# Patient Record
Sex: MALE | Race: WHITE | Employment: FULL TIME | URBAN - METROPOLITAN AREA
[De-identification: names, ages, dates, MRNs, and addresses within clinical notes are randomized per-mention and may not be internally consistent; named-entity substitution may affect disease eponyms.]

---

## 2017-01-23 ENCOUNTER — OFFICE VISIT (OUTPATIENT)
Dept: ORTHOPEDIC SURGERY | Age: 47
End: 2017-01-23

## 2017-01-23 VITALS
HEART RATE: 79 BPM | SYSTOLIC BLOOD PRESSURE: 130 MMHG | WEIGHT: 240 LBS | HEIGHT: 71 IN | BODY MASS INDEX: 33.6 KG/M2 | DIASTOLIC BLOOD PRESSURE: 72 MMHG

## 2017-01-23 DIAGNOSIS — M17.0 PRIMARY OSTEOARTHRITIS OF BOTH KNEES: Primary | ICD-10-CM

## 2017-01-23 PROCEDURE — 73562 X-RAY EXAM OF KNEE 3: CPT | Performed by: ORTHOPAEDIC SURGERY

## 2017-01-23 PROCEDURE — 99213 OFFICE O/P EST LOW 20 MIN: CPT | Performed by: ORTHOPAEDIC SURGERY

## 2017-01-26 ENCOUNTER — TELEPHONE (OUTPATIENT)
Dept: ORTHOPEDIC SURGERY | Age: 47
End: 2017-01-26

## 2017-02-01 ENCOUNTER — OFFICE VISIT (OUTPATIENT)
Dept: ORTHOPEDIC SURGERY | Age: 47
End: 2017-02-01

## 2017-02-01 VITALS
WEIGHT: 240 LBS | SYSTOLIC BLOOD PRESSURE: 132 MMHG | HEIGHT: 71 IN | HEART RATE: 55 BPM | BODY MASS INDEX: 33.6 KG/M2 | DIASTOLIC BLOOD PRESSURE: 80 MMHG

## 2017-02-01 DIAGNOSIS — M17.0 BILATERAL PRIMARY OSTEOARTHRITIS OF KNEE: Primary | ICD-10-CM

## 2017-02-01 PROCEDURE — 20610 DRAIN/INJ JOINT/BURSA W/O US: CPT | Performed by: ORTHOPAEDIC SURGERY

## 2018-02-21 ENCOUNTER — OFFICE VISIT (OUTPATIENT)
Dept: ORTHOPEDIC SURGERY | Age: 48
End: 2018-02-21

## 2018-02-21 VITALS
BODY MASS INDEX: 33.6 KG/M2 | HEIGHT: 71 IN | HEART RATE: 68 BPM | DIASTOLIC BLOOD PRESSURE: 81 MMHG | WEIGHT: 240 LBS | SYSTOLIC BLOOD PRESSURE: 122 MMHG

## 2018-02-21 DIAGNOSIS — M17.0 BILATERAL PRIMARY OSTEOARTHRITIS OF KNEE: Primary | ICD-10-CM

## 2018-02-21 PROCEDURE — 99213 OFFICE O/P EST LOW 20 MIN: CPT | Performed by: ORTHOPAEDIC SURGERY

## 2018-02-26 NOTE — PROGRESS NOTES
infection    X-rays:  4 views both knees show significant narrowing in the lateral compartment with subchondral sclerosis and early osteophyte formation on the right knee. Left knee shows mild joint space narrowing of the tibiofemoral joint space with no lytic or blastic lesions in either knee. No evidence for acute fracture or subluxation in either knee. Assessment:  1. Bilateral primary osteoarthritis of knee  XR Knee Bilateral Standing    XR KNEE RIGHT (3 VIEWS)    XR KNEE LEFT (3 VIEWS)       Orders  Orders Placed This Encounter   Procedures    XR Knee Bilateral Standing    XR KNEE RIGHT (3 VIEWS)    XR KNEE LEFT (3 VIEWS)       Plan:  Unfortunately, I informed him that his insurance company discontinued pain for Visco supplementation last December. Apparently the insurance company did not notify its patients of this decision. I have given him the option of trying to pursue sampling through different vendors for joint fluid therapy. He also understands the cortisone is another option. He's had very good success with joint fluid therapy for the last several years and would like to pursue that treatment. We will attempt to gain samples from one of our local vendors. If we are able to achieve this then, he would only be charged for the injection of each knee. He understands this and once we have a plan from one of the vendors regarding samples we will be in touch with him to schedule his next appointment. He understands and accepts this course of care.

## 2018-03-09 ENCOUNTER — OFFICE VISIT (OUTPATIENT)
Dept: ORTHOPEDIC SURGERY | Age: 48
End: 2018-03-09

## 2018-03-09 VITALS — WEIGHT: 245 LBS | HEIGHT: 71 IN | BODY MASS INDEX: 34.3 KG/M2

## 2018-03-09 DIAGNOSIS — M17.0 BILATERAL PRIMARY OSTEOARTHRITIS OF KNEE: Primary | ICD-10-CM

## 2018-03-09 PROCEDURE — 99999 PR OFFICE/OUTPT VISIT,PROCEDURE ONLY: CPT | Performed by: ORTHOPAEDIC SURGERY

## 2018-03-09 PROCEDURE — 20610 DRAIN/INJ JOINT/BURSA W/O US: CPT | Performed by: ORTHOPAEDIC SURGERY

## 2019-03-20 ENCOUNTER — OFFICE VISIT (OUTPATIENT)
Dept: ORTHOPEDIC SURGERY | Age: 49
End: 2019-03-20
Payer: COMMERCIAL

## 2019-03-20 VITALS
HEIGHT: 70 IN | HEART RATE: 72 BPM | BODY MASS INDEX: 30.92 KG/M2 | SYSTOLIC BLOOD PRESSURE: 100 MMHG | DIASTOLIC BLOOD PRESSURE: 69 MMHG | WEIGHT: 216 LBS

## 2019-03-20 DIAGNOSIS — M17.0 BILATERAL PRIMARY OSTEOARTHRITIS OF KNEE: Primary | ICD-10-CM

## 2019-03-20 PROCEDURE — G8427 DOCREV CUR MEDS BY ELIG CLIN: HCPCS | Performed by: ORTHOPAEDIC SURGERY

## 2019-03-20 PROCEDURE — G8484 FLU IMMUNIZE NO ADMIN: HCPCS | Performed by: ORTHOPAEDIC SURGERY

## 2019-03-20 PROCEDURE — 1036F TOBACCO NON-USER: CPT | Performed by: ORTHOPAEDIC SURGERY

## 2019-03-20 PROCEDURE — 99213 OFFICE O/P EST LOW 20 MIN: CPT | Performed by: ORTHOPAEDIC SURGERY

## 2019-03-20 PROCEDURE — G8417 CALC BMI ABV UP PARAM F/U: HCPCS | Performed by: ORTHOPAEDIC SURGERY

## 2019-03-20 RX ORDER — EPINEPHRINE 0.3 MG/.3ML
INJECTION SUBCUTANEOUS
COMMUNITY
Start: 2018-12-31

## 2019-03-20 RX ORDER — AMLODIPINE BESYLATE 5 MG/1
5 TABLET ORAL
COMMUNITY
Start: 2019-03-08

## 2019-03-20 RX ORDER — PROPRANOLOL HYDROCHLORIDE 20 MG/1
20 TABLET ORAL
COMMUNITY
Start: 2019-02-14

## 2019-03-20 RX ORDER — PHENTERMINE HYDROCHLORIDE 37.5 MG/1
37.5 TABLET ORAL
COMMUNITY
Start: 2019-02-22

## 2019-04-03 ENCOUNTER — OFFICE VISIT (OUTPATIENT)
Dept: ORTHOPEDIC SURGERY | Age: 49
End: 2019-04-03
Payer: COMMERCIAL

## 2019-04-03 VITALS — HEIGHT: 70 IN | BODY MASS INDEX: 30.92 KG/M2 | WEIGHT: 216 LBS

## 2019-04-03 DIAGNOSIS — M17.0 BILATERAL PRIMARY OSTEOARTHRITIS OF KNEE: Primary | ICD-10-CM

## 2019-04-03 PROCEDURE — 99999 PR OFFICE/OUTPT VISIT,PROCEDURE ONLY: CPT | Performed by: ORTHOPAEDIC SURGERY

## 2019-04-03 PROCEDURE — 20610 DRAIN/INJ JOINT/BURSA W/O US: CPT | Performed by: ORTHOPAEDIC SURGERY

## 2019-04-03 NOTE — PROGRESS NOTES
Timvisc:  NDC: [address]-81  Lot Number: [id number]  Expiration Date: 11/30/2021    Bilat knee. Obtained in the office today.

## 2019-04-03 NOTE — PROGRESS NOTES
Subjective:  bilateral knee pain. He is here for a Monovisc injection for the  bilateral knee(s). Objective:   Height 5' 10\" (1.778 m), weight 216 lb (98 kg). There is a milld joint effusion noted of the bilateral knee(s). There is mild pain with range of motion testing. There is no significant  instability noted. Neuro exam grossly intact both lower extremities. Intact sensation to light touch. Motor exam 4+ to 5/5 in all major motor groups. Negative Campbell's sign. Skin is warm, dry and intact with out erythema or significant increased temperature around the knee joint(s). Assessment:  Degenerative Joint Disease of the bilateral Knee(s). Plan:  Discussed Monovisc injection(s) including all  risks and benefits including increased pain, drug reaction, infection, bleeding, lack of improvement and  neurovascular injury. All the questions were answered. PROCEDURE NOTE:  PRE-PROCEDURE DIAGNOSIS: bilaterally DJD knee(s)  POST-PROCEDURE DIAGNOSIS: bilaterally DJD knee(s)    With the patient's permission, his knee(s) were both prepped in standard sterile fashion with Betadine and  Alcohol. The prefilled Monovisc injection was placed into the lateral joint space compartment(s) of each knee without difficulty using careful aspiration and injection technique. The he tolerated the procedure(s) well without difficulty. A band-aid was applied. POST-PROCEDURE INSTRUCTIONS GIVEN TO PATIENT: He was advised to ice the knee for 15-20 minutes to relieve any injection site related pain. Decrease activity for the next 24 to 48 hours. May use prescription or OTC pain relievers as needed      FOLLOW-UP:   As directed or call or return to clinic if these symptoms worsen or fail to improve as anticipated. If at any time you are concerned you may contact the office for further instructions or care.

## 2020-03-27 ENCOUNTER — OFFICE VISIT (OUTPATIENT)
Dept: ORTHOPEDIC SURGERY | Age: 50
End: 2020-03-27
Payer: COMMERCIAL

## 2020-03-27 VITALS — BODY MASS INDEX: 28.77 KG/M2 | HEIGHT: 70 IN | WEIGHT: 201 LBS | TEMPERATURE: 97.8 F

## 2020-03-27 PROCEDURE — 99213 OFFICE O/P EST LOW 20 MIN: CPT | Performed by: ORTHOPAEDIC SURGERY

## 2020-03-27 NOTE — PROGRESS NOTES
Alfred Whaley  <[id number]>  Encounter Date: 3/27/2020  YOB: 1970    Chief Complaint   Patient presents with    Knee Pain     f/u for Bilateral knee pain. History:Mr. Alfred Whaley is here in follow up regarding his bilateral knee pain. He has had previous arthroscopic/chondral repair of his lateral femoral condyle of the right knee over 6 years ago. I have been treating both of his knees for the last several years with Visco supplementation for mild to moderate osteoarthritis. The injections have provided significant pain relief and ability to maintain his function for almost a year after each series. He's had previous short-term success with cortisone but did not get the lasting benefit like he has had with joint fluid therapy. Today he reports the pain is starting to increase in both of his knees again. He still gets some mild swelling in the right knee with activities. No new injury since his last visit. No numbness or tingling radiates down the legs. No catching or locking symptoms. Pain is mostly dull achy and can occasionally interfere with his activities. Exam:  Temp 97.8 °F (36.6 °C)   Ht 5' 10\" (1.778 m)   Wt 201 lb (91.2 kg)   BMI 28.84 kg/m²   General: Alert and oriented x3, No acute distress, Cooperative and conversant. Obesity Absent   Mood and affect are appropriate. Bilateral knees: Right knee shows mild effusion with no significant varus or valgus malalignment. Normal muscle bulk and tone throughout the quadricep. He has full extension with flexion comfortable beyond 120 degrees. Mild tenderness along the joint line more laterally than medially. Trace crepitation felt with range of motion. No instability to ligamentous exam.  Left knee: Trace effusion with no erythema. No significant varus or valgus malalignment. Normal muscle bulk and tone throughout the quadricep. He has full extension with flexion comfortable beyond 120 degrees.   Trace tenderness along the joint line medially. No instability ligamentous exam.  Sensation to light touch grossly present throughout the bilateral lower extremity  Capillary refill < 2 seconds   Skin: no erythema, lesions or rashes    X-rays: 4 views of both knees were obtained in the office today. Compared to last year's views show slight interval progression of his arthritic changes with the right side showing more moderate arthritis with marginal osteophytes at the patellofemoral and lateral compartment and subchondral sclerosis in the lateral compartment. Left knee also shows mild arthritic change with joint space narrowing but no significant osteophyte formation. No lytic or blastic lesions within the metaphyseal regions of either knee. Assessment:   Diagnosis Orders   1. Bilateral primary osteoarthritis of knee  XR Knee Bilateral Standing    XR KNEE LEFT (3 VIEWS)    XR KNEE RIGHT (3 VIEWS)    MONOVISC INJECTION (For Auth/Precert)       Orders  Orders Placed This Encounter   Procedures    XR Knee Bilateral Standing    XR KNEE LEFT (3 VIEWS)    XR KNEE RIGHT (3 VIEWS)    MONOVISC INJECTION (For Auth/Precert)       Plan:  He has done quite well with Visco supplementation for the last 6 years. This has allowed him to delay further arthritic progression and possible joint replacement. I recommend that he continue with joint fluid treatments. His insurance company has changed and we will get prior authorization for Monovisc injections which are their preferred Visco supplementation. Once this is approved the office will call him to schedule his follow-up visit to receive the injections. He understands and accepts this course of care. Documentation was done using voice recognition dragon software. Every effort was made to ensure accuracy; however, inadvertent  Unintentional computerized transcription errors may be present.

## 2020-04-29 ENCOUNTER — TELEPHONE (OUTPATIENT)
Dept: ORTHOPEDIC SURGERY | Age: 50
End: 2020-04-29

## 2020-05-11 ENCOUNTER — OFFICE VISIT (OUTPATIENT)
Dept: ORTHOPEDIC SURGERY | Age: 50
End: 2020-05-11
Payer: COMMERCIAL

## 2020-05-11 VITALS — WEIGHT: 201 LBS | HEIGHT: 70 IN | TEMPERATURE: 98.4 F | BODY MASS INDEX: 28.77 KG/M2

## 2020-05-11 PROBLEM — M17.0 BILATERAL PRIMARY OSTEOARTHRITIS OF KNEE: Status: ACTIVE | Noted: 2020-05-11

## 2020-05-11 PROCEDURE — 99999 PR OFFICE/OUTPT VISIT,PROCEDURE ONLY: CPT | Performed by: ORTHOPAEDIC SURGERY

## 2020-05-11 PROCEDURE — 20610 DRAIN/INJ JOINT/BURSA W/O US: CPT | Performed by: ORTHOPAEDIC SURGERY

## 2021-02-19 ENCOUNTER — OFFICE VISIT (OUTPATIENT)
Dept: ORTHOPEDIC SURGERY | Age: 51
End: 2021-02-19
Payer: COMMERCIAL

## 2021-02-19 VITALS — WEIGHT: 205 LBS | BODY MASS INDEX: 29.35 KG/M2 | TEMPERATURE: 98.1 F | HEIGHT: 70 IN

## 2021-02-19 DIAGNOSIS — M17.0 BILATERAL PRIMARY OSTEOARTHRITIS OF KNEE: Primary | ICD-10-CM

## 2021-02-19 PROCEDURE — 99212 OFFICE O/P EST SF 10 MIN: CPT | Performed by: ORTHOPAEDIC SURGERY

## 2021-02-19 RX ORDER — IBUPROFEN 200 MG
200 TABLET ORAL EVERY 6 HOURS PRN
COMMUNITY

## 2021-02-19 NOTE — PROGRESS NOTES
Diego Bahena MD  [address]  [address]  [address] [facility]  [facility] [facility] [address]    Nadira Hernandes  <[id number]>  Encounter Date: 2/19/2021  YOB: 1970    Chief Complaint   Patient presents with    Follow-up     Bilateral knee Monovisc injection; 5/11/20. History:Mr. Nadira Hernandes is here in follow up regarding his bilateral knee pain. He last underwent bilateral knee Monovisc injections on 5/11/2020. The injections provided him long term relief but, for the past 6 weeks his pain has increased up to 3-4/10. His right knee is worse than his left, when it comes to flexion, he feels more stiffness and discomfort. The pain in his knees keeps him awake once every couple months, mainly if he has a busy day with a lot of walking. He takes OTC antiinflammatories as needed. Today, he would like to discuss repeating the injections. Exam:  Temp 98.1 °F (36.7 °C) (Infrared)   Ht 5' 10\" (1.778 m)   Wt 205 lb (93 kg)   BMI 29.41 kg/m²   General: Alert and oriented x3, No acute distress, Cooperative and conversant. Mood and affect are appropriate. Bilateral Knee : Inspection & Skin:  Knees show neutral to slight valgus alignment bilaterally. Normal muscle bulk and tone for patient's age and activity level. No significant effusion. There are no rashes, ulcerations or lesions. Palpation:     Mild tenderness on palpation along the medial joint line both knees.  Moderate tenderness on palpation along the lateral joint line R>L.  Extensor mechanism intact on palpation. Range of Motion:     Right:   o Extension: 0°  o Flexion: 120 °   Left:  o Extension: 0°  o Flexion: 125°  Strength:     Right:  o Quad 5/5. Hamstrings 5/5.   o Hip and ankle motor function are grossly intact.  Left:  o Quad 5/5. Hamstrings 5/5.   o Hip and ankle motor function are grossly intact.      Special Tests:    Does not open to my presence. I have reviewed the chart and discharge instructions (if applicable) and agree that the record reflects my personal performance and is accurate and complete. Lottie Vicente MD, 2/21/21, 6:33 PM EST. Documentation was done using voice recognition dragon software. Every effort was made to ensure accuracy; however, inadvertent  Unintentional computerized transcription errors may be present.

## 2021-03-08 ENCOUNTER — TELEPHONE (OUTPATIENT)
Dept: ORTHOPEDIC SURGERY | Age: 51
End: 2021-03-08

## 2021-03-08 NOTE — TELEPHONE ENCOUNTER
03/08/2021  MONOVISC   BILATERAL KNEES. PREVIOUS AUTHORIZATION ([id number]) or NEW # from [address] [place] [address] ([id number]) IS GOOD UNTIL 4/24/2021. PATIENT CAN HAVE ANOTHER INJECTION 3 MONTHS AFTER LAST INJECTION. PATIENT CAN HAVE INJECTION NOW AND BEFORE THE AUTHORIZATION EXPIRES ON 4/24/2021. ANA MARIA MCKENNA @ [address] [place] [address] REF # [address] [place] [address] [id number].   AP

## 2021-04-07 ENCOUNTER — OFFICE VISIT (OUTPATIENT)
Dept: ORTHOPEDIC SURGERY | Age: 51
End: 2021-04-07
Payer: COMMERCIAL

## 2021-04-07 DIAGNOSIS — M17.0 BILATERAL PRIMARY OSTEOARTHRITIS OF KNEE: Primary | ICD-10-CM

## 2021-04-07 PROCEDURE — 20610 DRAIN/INJ JOINT/BURSA W/O US: CPT | Performed by: ORTHOPAEDIC SURGERY

## 2021-04-07 NOTE — PROGRESS NOTES
Santiago Bahena MD  [facility]  [address] [facility], [address] [facility]  [address] [facility] [address] [facility], [facility] [place] [address]    Subjective:  bilateral knee pain. He is here for bilateral Monovisc injection for the  bilateral knee(s). Objective: There is a mild joint effusion noted of the bilateral knee(s). There is mild pain with range of motion testing. There is no significant  instability noted. Neuro exam grossly intact both lower extremities. Intact sensation to light touch. Motor exam 4+ to 5/5 in all major motor groups. Negative Campbell's sign. Skin is warm, dry and intact with out erythema or significant increased temperature around the knee joint(s). Assessment:  Degenerative Joint Disease of the bilateral Knee(s). Plan:  Discussed Monovisc injection including all  risks and benefits including increased pain, drug reaction, infection, bleeding, lack of improvement and  neurovascular injury. All the questions were answered. PROCEDURE NOTE:  PRE-PROCEDURE DIAGNOSIS: DJD knee  POST-PROCEDURE DIAGNOSIS: DJD knee    With the patient's permission, his bilateral knese werte prepped in standard sterile fashion with betadine and alcohol. The prefilled 4mL injection of the preferred Monosvisc was injected into the bilateral lateral joint space compartments without difficulty. The patient tolerated the procedure(s) well without difficulty. A band-aid was applied. POST-PROCEDURE INSTRUCTIONS GIVEN TO PATIENT: The patient was advised to ice the knee for 15-20 minutes to relieve any injection site related pain. Decrease activity for the next 24 to 48 hours. May use prescription or OTC pain relievers as needed      FOLLOW-UP:   As directed or call or return to clinic if these symptoms worsen or fail to improve as anticipated. If at any time you are concerned you may contact the office for further instructions or care.      By signing my name below, I,

## 2022-02-09 ENCOUNTER — HOSPITAL ENCOUNTER (OUTPATIENT)
Dept: GENERAL RADIOLOGY | Age: 52
Discharge: HOME | End: 2022-02-09
Payer: COMMERCIAL

## 2022-02-09 DIAGNOSIS — M17.0 ARTHRITIS OF BOTH KNEES: ICD-10-CM

## 2022-02-09 PROCEDURE — 73564 X-RAY EXAM KNEE 4 OR MORE: CPT

## 2023-11-09 ENCOUNTER — OFFICE (OUTPATIENT)
Dept: URBAN - METROPOLITAN AREA CLINIC 17 | Facility: CLINIC | Age: 53
End: 2023-11-09

## 2023-11-09 VITALS
OXYGEN SATURATION: 97 % | SYSTOLIC BLOOD PRESSURE: 124 MMHG | HEIGHT: 71 IN | WEIGHT: 183 LBS | DIASTOLIC BLOOD PRESSURE: 82 MMHG | HEART RATE: 83 BPM

## 2023-11-09 DIAGNOSIS — Z80.0 FAMILY HISTORY OF MALIGNANT NEOPLASM OF DIGESTIVE ORGANS: ICD-10-CM

## 2023-11-09 DIAGNOSIS — Z86.010 PERSONAL HISTORY OF COLONIC POLYPS: ICD-10-CM

## 2023-11-09 PROCEDURE — 99202 OFFICE O/P NEW SF 15 MIN: CPT | Performed by: INTERNAL MEDICINE

## 2024-01-30 DIAGNOSIS — M17.0 BILATERAL PRIMARY OSTEOARTHRITIS OF KNEE: Primary | ICD-10-CM

## 2024-01-30 NOTE — PROGRESS NOTES
Date of Encounter: 2/1/2024  Patient Name:Kali Weiss  Medical Record Number: [id number]    Chief Complaint   Patient presents with    Knee Pain     Bilateral Knees       History of Present Illness:  Kali Weiss is a 53 y.o. male here for evaluation of his bilateral knees.  His current symptoms are described below and I reviewed them with him today.  He has been undergoing intermittent visco supplementation injections since 2010 on the right and 2013 on the left.  His last injections were Monovisc in both knees 3/24/2022 from me (he has had this brand since 2019 with excellent results).  Lat year due to insurance issues he had Euflexxa injections in May of 2023 by a different physician and he did not feel like they helped as much as the Monvisc has in the past.  No recent injuries.  He still is active and plays basketball at least once week.    Pain Assessment  Location of Pain: Knee  Location Modifiers: Right, Left  Severity of Pain: 8  Quality of Pain: Aching, Dull  Duration of Pain: Persistent  Frequency of Pain: Intermittent  Date Pain First Started:  (15 years ago)  Aggravating Factors: Bending, Stretching, Straightening, Exercise, Kneeling, Squatting, Standing, Walking, Stairs  Limiting Behavior: Yes  Result of Injury: No  Work-Related Injury: No  Are there other pain locations you wish to document?: No    Past Medical History:   Diagnosis Date    Low back pain 9/14/2015       Past Surgical History:   Procedure Laterality Date    ACHILLES TENDON SURGERY Left     KNEE CARTILAGE SURGERY Bilateral     SHOULDER DEBRIDEMENT Left        Current Outpatient Medications   Medication Sig Dispense Refill    Flaxseed, Linseed, (FLAX SEED OIL) 1000 MG CAPS Take 1,000 mg by mouth daily      Cyanocobalamin ER 1000 MCG TBCR Take 1 tablet by mouth daily      Ascorbic Acid (VITAMIN C) 1000 MG tablet Take 1 tablet by mouth daily       No current facility-administered medications for this visit.      allergies, social and

## 2024-02-01 ENCOUNTER — OFFICE VISIT (OUTPATIENT)
Age: 54
End: 2024-02-01
Payer: COMMERCIAL

## 2024-02-01 ENCOUNTER — HOSPITAL ENCOUNTER (OUTPATIENT)
Age: 54
Discharge: HOME | End: 2024-02-01

## 2024-02-01 VITALS — HEIGHT: 70 IN | WEIGHT: 205 LBS | BODY MASS INDEX: 29.35 KG/M2

## 2024-02-01 DIAGNOSIS — M17.0 BILATERAL PRIMARY OSTEOARTHRITIS OF KNEE: Primary | ICD-10-CM

## 2024-02-01 DIAGNOSIS — M17.0 BILATERAL PRIMARY OSTEOARTHRITIS OF KNEE: ICD-10-CM

## 2024-02-01 PROBLEM — F41.8 SITUATIONAL ANXIETY: Status: ACTIVE | Noted: 2023-05-19

## 2024-02-01 PROBLEM — Z80.0 FAMILY HISTORY OF COLON CANCER: Status: ACTIVE | Noted: 2024-02-01

## 2024-02-01 PROCEDURE — 99213 OFFICE O/P EST LOW 20 MIN: CPT | Performed by: ORTHOPAEDIC SURGERY

## 2024-02-01 RX ORDER — MULTIVIT WITH MINERALS/LUTEIN
1000 TABLET ORAL DAILY
COMMUNITY

## 2024-02-01 RX ORDER — DIMENHYDRINATE 50 MG
1000 TABLET ORAL DAILY
COMMUNITY

## 2024-02-08 ENCOUNTER — OFFICE VISIT (OUTPATIENT)
Age: 54
End: 2024-02-08

## 2024-02-08 VITALS — WEIGHT: 184.3 LBS | BODY MASS INDEX: 26.39 KG/M2 | HEIGHT: 70 IN

## 2024-02-08 DIAGNOSIS — M17.0 BILATERAL PRIMARY OSTEOARTHRITIS OF KNEE: Primary | ICD-10-CM

## 2024-02-08 NOTE — PROGRESS NOTES
Subjective:  bilateral knee pain.   He is here for a Monovisc injection for the  bilateral knees.     Objective:   Height 1.778 m (5' 10\"), weight 83.6 kg (184 lb 4.9 oz).    There is minimal joint effusion noted of the bilateral knees. There is mild pain with non weight bearing range of motion testing. There is no significant  instability noted.    Neuro exam grossly intact both lower extremities. Intact sensation to light touch. Motor exam 5/5 in all major motor groups.      Skin is warm, dry and intact with out erythema or significant increased temperature around the knee joints.     Assessment:   Diagnosis Orders   1. Bilateral primary osteoarthritis of knee  MONOVISC INJECTION            Plan:  Discussed Monovisc injections including all  risks and benefits including increased pain, drug reaction, infection, bleeding, lack of improvement and  neurovascular injury.   All the questions were answered.    PROCEDURE NOTE:  PRE-PROCEDURE DIAGNOSIS: bilateral DJD knees  POST-PROCEDURE DIAGNOSIS: bilateral DJD knees    With the patient's permission, his bilateral  knees were prepped in standard sterile fashion with Betadine. The prefilled Monovisc (88 mg) injection was placed into the joint space compartment of each knee via and inferolateral approach without difficulty using careful aspiration and injection technique.  The he tolerated the procedures well.  A band-aid was applied to each site.     POST-PROCEDURE INSTRUCTIONS GIVEN TO PATIENT: He was advised to ice the knee for 15-20 minutes to relieve any injection site related pain. Decrease activity for the next 24 to 48 hours. May use prescription or OTC pain relievers as needed    FOLLOW-UP:   As directed or call or return to clinic if these symptoms worsen or fail to improve as anticipated. If at any time you are concerned you may contact the office for further instructions or care.

## 2024-08-26 ENCOUNTER — OFFICE VISIT (OUTPATIENT)
Age: 54
End: 2024-08-26
Payer: COMMERCIAL

## 2024-08-26 VITALS — WEIGHT: 184 LBS | BODY MASS INDEX: 26.34 KG/M2 | HEIGHT: 70 IN

## 2024-08-26 DIAGNOSIS — M17.11 PRIMARY OSTEOARTHRITIS OF RIGHT KNEE: Primary | ICD-10-CM

## 2024-08-26 DIAGNOSIS — M17.12 PRIMARY OSTEOARTHRITIS OF LEFT KNEE: ICD-10-CM

## 2024-08-26 PROCEDURE — 99214 OFFICE O/P EST MOD 30 MIN: CPT | Performed by: ORTHOPAEDIC SURGERY

## 2024-08-27 ENCOUNTER — PREP FOR PROCEDURE (OUTPATIENT)
Age: 54
End: 2024-08-27

## 2024-08-27 DIAGNOSIS — M17.11 PRIMARY OSTEOARTHRITIS OF RIGHT KNEE: ICD-10-CM

## 2024-08-27 DIAGNOSIS — Z01.818 PRE-OP TESTING: Primary | ICD-10-CM

## 2024-08-27 RX ORDER — SODIUM CHLORIDE 0.9 % (FLUSH) 0.9 %
5-40 SYRINGE (ML) INJECTION PRN
Status: CANCELLED | OUTPATIENT
Start: 2024-08-27

## 2024-08-27 RX ORDER — SODIUM CHLORIDE 9 MG/ML
INJECTION, SOLUTION INTRAVENOUS PRN
Status: CANCELLED | OUTPATIENT
Start: 2024-08-27

## 2024-08-27 RX ORDER — TRANEXAMIC ACID 650 MG/1
1950 TABLET ORAL
Status: CANCELLED | OUTPATIENT
Start: 2024-08-27

## 2024-08-27 RX ORDER — ACETAMINOPHEN 325 MG/1
1000 TABLET ORAL ONCE
Status: CANCELLED | OUTPATIENT
Start: 2024-08-27 | End: 2024-08-27

## 2024-08-27 RX ORDER — SODIUM CHLORIDE 9 MG/ML
INJECTION, SOLUTION INTRAVENOUS CONTINUOUS
Status: CANCELLED | OUTPATIENT
Start: 2024-08-27

## 2024-08-27 RX ORDER — SODIUM CHLORIDE 0.9 % (FLUSH) 0.9 %
5-40 SYRINGE (ML) INJECTION EVERY 12 HOURS SCHEDULED
Status: CANCELLED | OUTPATIENT
Start: 2024-08-27

## 2024-08-27 RX ORDER — MELOXICAM 7.5 MG/1
7.5 TABLET ORAL ONCE
Status: CANCELLED | OUTPATIENT
Start: 2024-08-27 | End: 2024-08-27

## 2024-09-12 ENCOUNTER — TELEPHONE (OUTPATIENT)
Dept: ORTHOPEDIC SURGERY | Age: 54
End: 2024-09-12

## 2024-09-12 DIAGNOSIS — Z96.651 TOTAL KNEE REPLACEMENT STATUS, RIGHT: Primary | ICD-10-CM

## 2024-09-12 PROCEDURE — MISCCOLD COLD THERAPY UNIT AND PAD: Performed by: ORTHOPAEDIC SURGERY

## 2024-09-13 DIAGNOSIS — Z01.818 PRE-OP TESTING: ICD-10-CM

## 2024-09-13 LAB
25(OH)D3 SERPL-MCNC: 17.7 NG/ML
ABO + RH BLD: NORMAL
ALBUMIN SERPL-MCNC: 3.7 G/DL (ref 3.4–5)
ALBUMIN/GLOB SERPL: 2.2 {RATIO} (ref 1.1–2.2)
ALP SERPL-CCNC: 37 U/L (ref 40–129)
ALT SERPL-CCNC: 21 U/L (ref 10–40)
ANION GAP SERPL CALCULATED.3IONS-SCNC: 9 MMOL/L (ref 3–16)
APTT BLD: 27.8 SEC (ref 22.1–36.4)
AST SERPL-CCNC: 19 U/L (ref 15–37)
BASOPHILS # BLD: 0.1 K/UL (ref 0–0.2)
BASOPHILS NFR BLD: 0.7 %
BILIRUB SERPL-MCNC: <0.2 MG/DL (ref 0–1)
BLD GP AB SCN SERPL QL: NORMAL
BUN SERPL-MCNC: 17 MG/DL (ref 7–20)
CALCIUM SERPL-MCNC: 9 MG/DL (ref 8.3–10.6)
CHLORIDE SERPL-SCNC: 104 MMOL/L (ref 99–110)
CO2 SERPL-SCNC: 26 MMOL/L (ref 21–32)
CREAT SERPL-MCNC: 0.9 MG/DL (ref 0.9–1.3)
DEPRECATED RDW RBC AUTO: 13.2 % (ref 12.4–15.4)
EOSINOPHIL # BLD: 0.1 K/UL (ref 0–0.6)
EOSINOPHIL NFR BLD: 1.8 %
GFR SERPLBLD CREATININE-BSD FMLA CKD-EPI: >90 ML/MIN/{1.73_M2}
GLUCOSE SERPL-MCNC: 95 MG/DL (ref 70–99)
HCT VFR BLD AUTO: 45.5 % (ref 40.5–52.5)
HGB BLD-MCNC: 15.6 G/DL (ref 13.5–17.5)
INR PPP: 0.99 (ref 0.85–1.15)
LYMPHOCYTES # BLD: 1.6 K/UL (ref 1–5.1)
LYMPHOCYTES NFR BLD: 20.1 %
MCH RBC QN AUTO: 30.6 PG (ref 26–34)
MCHC RBC AUTO-ENTMCNC: 34.3 G/DL (ref 31–36)
MCV RBC AUTO: 89.3 FL (ref 80–100)
MONOCYTES # BLD: 0.5 K/UL (ref 0–1.3)
MONOCYTES NFR BLD: 6 %
NEUTROPHILS # BLD: 5.6 K/UL (ref 1.7–7.7)
NEUTROPHILS NFR BLD: 71.4 %
PLATELET # BLD AUTO: 294 K/UL (ref 135–450)
PMV BLD AUTO: 8 FL (ref 5–10.5)
POTASSIUM SERPL-SCNC: 4.4 MMOL/L (ref 3.5–5.1)
PROT SERPL-MCNC: 5.4 G/DL (ref 6.4–8.2)
PROTHROMBIN TIME: 13.3 SEC (ref 11.9–14.9)
RBC # BLD AUTO: 5.09 M/UL (ref 4.2–5.9)
SODIUM SERPL-SCNC: 139 MMOL/L (ref 136–145)
WBC # BLD AUTO: 7.8 K/UL (ref 4–11)

## 2024-09-14 LAB
EST. AVERAGE GLUCOSE BLD GHB EST-MCNC: 91.1 MG/DL
HBA1C MFR BLD: 4.8 %

## 2024-09-15 LAB
BACTERIA UR CULT: NORMAL
MRSA SPEC QL CULT: NORMAL

## 2024-09-16 ENCOUNTER — TELEPHONE (OUTPATIENT)
Age: 54
End: 2024-09-16

## 2024-09-16 DIAGNOSIS — E55.9 VITAMIN D DEFICIENCY: ICD-10-CM

## 2024-09-16 DIAGNOSIS — Z22.321 MSSA (METHICILLIN-SUSCEPTIBLE STAPHYLOCOCCUS AUREUS) COLONIZATION: Primary | ICD-10-CM

## 2024-09-16 PROBLEM — M17.12 PRIMARY OSTEOARTHRITIS OF LEFT KNEE: Status: ACTIVE | Noted: 2024-09-16

## 2024-09-16 RX ORDER — MUPIROCIN 20 MG/G
OINTMENT TOPICAL
Qty: 22 G | Refills: 0 | Status: SHIPPED | OUTPATIENT
Start: 2024-09-16

## 2024-09-16 RX ORDER — MULTIVIT-MIN/IRON/FOLIC ACID/K 18-600-40
2000 CAPSULE ORAL DAILY
Qty: 30 CAPSULE | Refills: 3 | Status: SHIPPED | OUTPATIENT
Start: 2024-09-16 | End: 2024-10-16

## 2024-09-17 ENCOUNTER — TELEPHONE (OUTPATIENT)
Dept: ORTHOPEDIC SURGERY | Age: 54
End: 2024-09-17

## 2024-09-17 RX ORDER — MULTIVITAMIN WITH IRON
1 TABLET ORAL DAILY
COMMUNITY

## 2024-09-17 NOTE — H&P (VIEW-ONLY)
SUBJECTIVE:   CHIEF COMPLAINT:  Chief Complaint   Patient presents with   • Pre-Op Exam     NURSING NOTES:  Nursing Notes:   Vanita Caputo  09/17/24 [id number]  Signed  Pre-op exam      HISTORY OF PRESENT ILLNESS:  (Location/Symptom, Timing/Onset, Context/Setting, Quality, Duration, Alleviating/Aggrevating Factors, Severity  1-3 for level 3, 4+ for acute or 3+ for chronic for level 4/5)  Kali Weiss is a 54 year old male who presents today with the following concerns:  Here for preop physical.  Scheduled for right total knee replacement.  No ear  PAST MEDICAL HISTORY:  Past Medical History:   Diagnosis Date   • Adverse effect of anesthesia     Nausea and vomting   • Arthritis     knees and shoulder   • Hypercholesteremia     diet controlled   • Hyperlipidemia    • Shingles     8-9 YEARS AGO        CURRENT MEDICATIONS:    Current Outpatient Medications:   •  cyanocobalamin (VITAMIN B12) 1,000 mcg tablet, Take 1 Tab by mouth daily, Disp: , Rfl:   •  multivitamin (MULTIPLE VITAMINS) tablet, Take 1 Tab by mouth daily, Disp: , Rfl:   •  terbinafine HCL (LAMISIL) 250 mg tablet, Take 250 mg by mouth daily, Disp: , Rfl:   •  EPINEPHrine (EPIPEN) 0.3 mg/0.3 mL syringe, Inject 0.3 mL intramuscularly once as needed for up to 1 dose, Disp: 2 Each, Rfl: 1  •  omega 3-dha-epa-fish oil (FISH OIL) 100-160-1,000 mg Capsule, Take by mouth daily at 6pm, Disp: , Rfl:   •  FLAXSEED OIL ORAL, Take by mouth daily at 6pm, Disp: , Rfl:   •  ascorbic acid (VITAMIN C ORAL), Take by mouth daily at 6pm, Disp: , Rfl:   •  mecobalamin (B12 ACTIVE ORAL), Take by mouth daily at 6pm, Disp: , Rfl:   •  allergen injection syringe, Inject 1 Each subcutaneously every 2 weeks Environmental Immunotherapy Injection., Disp: , Rfl:   •  ibuprofen (ADVIL) 200 mg tablet, Take 200 mg by mouth every 8 hours as needed, Disp: , Rfl:   •  mupirocin (BACTROBAN) 2 % topical ointment, Apply Start on 9/26/24, Disp: , Rfl:   •  propranoloL (INDERAL) 20 mg tablet,

## 2024-09-26 ENCOUNTER — PATIENT MESSAGE (OUTPATIENT)
Age: 54
End: 2024-09-26

## 2024-09-27 ENCOUNTER — TELEPHONE (OUTPATIENT)
Age: 54
End: 2024-09-27

## 2024-09-30 ENCOUNTER — ANESTHESIA EVENT (OUTPATIENT)
Age: 54
End: 2024-09-30
Payer: COMMERCIAL

## 2024-09-30 PROCEDURE — 2500000003 HC RX 250 WO HCPCS: Performed by: ORTHOPAEDIC SURGERY

## 2024-09-30 PROCEDURE — 6360000002 HC RX W HCPCS: Performed by: ORTHOPAEDIC SURGERY

## 2024-09-30 PROCEDURE — 2580000003 HC RX 258: Performed by: ORTHOPAEDIC SURGERY

## 2024-10-01 ENCOUNTER — APPOINTMENT (OUTPATIENT)
Age: 54
End: 2024-10-01
Attending: ORTHOPAEDIC SURGERY
Payer: COMMERCIAL

## 2024-10-01 ENCOUNTER — HOSPITAL ENCOUNTER (OUTPATIENT)
Age: 54
Setting detail: OUTPATIENT SURGERY
Discharge: HOME | End: 2024-10-01
Attending: ORTHOPAEDIC SURGERY | Admitting: ORTHOPAEDIC SURGERY
Payer: COMMERCIAL

## 2024-10-01 ENCOUNTER — ANESTHESIA (OUTPATIENT)
Age: 54
End: 2024-10-01
Payer: COMMERCIAL

## 2024-10-01 VITALS
TEMPERATURE: 97.9 F | HEIGHT: 71 IN | DIASTOLIC BLOOD PRESSURE: 67 MMHG | HEART RATE: 62 BPM | RESPIRATION RATE: 13 BRPM | SYSTOLIC BLOOD PRESSURE: 105 MMHG | WEIGHT: 185 LBS | BODY MASS INDEX: 25.9 KG/M2 | OXYGEN SATURATION: 99 %

## 2024-10-01 DIAGNOSIS — Z22.321 MSSA (METHICILLIN-SUSCEPTIBLE STAPHYLOCOCCUS AUREUS) COLONIZATION: ICD-10-CM

## 2024-10-01 DIAGNOSIS — M17.11 PRIMARY OSTEOARTHRITIS OF RIGHT KNEE: Primary | ICD-10-CM

## 2024-10-01 LAB — GLUCOSE BLD-MCNC: 98 MG/DL (ref 70–99)

## 2024-10-01 PROCEDURE — 6360000002 HC RX W HCPCS: Performed by: NURSE ANESTHETIST, CERTIFIED REGISTERED

## 2024-10-01 PROCEDURE — 94761 N-INVAS EAR/PLS OXIMETRY MLT: CPT

## 2024-10-01 PROCEDURE — 6370000000 HC RX 637 (ALT 250 FOR IP): Performed by: ORTHOPAEDIC SURGERY

## 2024-10-01 PROCEDURE — 2580000003 HC RX 258: Performed by: ORTHOPAEDIC SURGERY

## 2024-10-01 PROCEDURE — C1713 ANCHOR/SCREW BN/BN,TIS/BN: HCPCS | Performed by: ORTHOPAEDIC SURGERY

## 2024-10-01 PROCEDURE — 3700000000 HC ANESTHESIA ATTENDED CARE: Performed by: ORTHOPAEDIC SURGERY

## 2024-10-01 PROCEDURE — 27447 TOTAL KNEE ARTHROPLASTY: CPT | Performed by: ORTHOPAEDIC SURGERY

## 2024-10-01 PROCEDURE — 7100000000 HC PACU RECOVERY - FIRST 15 MIN: Performed by: ORTHOPAEDIC SURGERY

## 2024-10-01 PROCEDURE — 3600000019 HC SURGERY ROBOT ADDTL 15MIN: Performed by: ORTHOPAEDIC SURGERY

## 2024-10-01 PROCEDURE — 2720000010 HC SURG SUPPLY STERILE: Performed by: ORTHOPAEDIC SURGERY

## 2024-10-01 PROCEDURE — 97110 THERAPEUTIC EXERCISES: CPT

## 2024-10-01 PROCEDURE — 6360000002 HC RX W HCPCS

## 2024-10-01 PROCEDURE — 2709999900 HC NON-CHARGEABLE SUPPLY: Performed by: ORTHOPAEDIC SURGERY

## 2024-10-01 PROCEDURE — 3600000009 HC SURGERY ROBOT BASE: Performed by: ORTHOPAEDIC SURGERY

## 2024-10-01 PROCEDURE — C1776 JOINT DEVICE (IMPLANTABLE): HCPCS | Performed by: ORTHOPAEDIC SURGERY

## 2024-10-01 PROCEDURE — 82962 GLUCOSE BLOOD TEST: CPT

## 2024-10-01 PROCEDURE — 6360000002 HC RX W HCPCS: Performed by: ANESTHESIOLOGY

## 2024-10-01 PROCEDURE — 2500000003 HC RX 250 WO HCPCS: Performed by: ORTHOPAEDIC SURGERY

## 2024-10-01 PROCEDURE — 2500000003 HC RX 250 WO HCPCS: Performed by: NURSE ANESTHETIST, CERTIFIED REGISTERED

## 2024-10-01 PROCEDURE — 6360000002 HC RX W HCPCS: Performed by: ORTHOPAEDIC SURGERY

## 2024-10-01 PROCEDURE — 7100000001 HC PACU RECOVERY - ADDTL 15 MIN: Performed by: ORTHOPAEDIC SURGERY

## 2024-10-01 PROCEDURE — 2580000003 HC RX 258: Performed by: NURSE ANESTHETIST, CERTIFIED REGISTERED

## 2024-10-01 PROCEDURE — S2900 ROBOTIC SURGICAL SYSTEM: HCPCS | Performed by: ORTHOPAEDIC SURGERY

## 2024-10-01 PROCEDURE — 7100000011 HC PHASE II RECOVERY - ADDTL 15 MIN: Performed by: ORTHOPAEDIC SURGERY

## 2024-10-01 PROCEDURE — A4217 STERILE WATER/SALINE, 500 ML: HCPCS | Performed by: ORTHOPAEDIC SURGERY

## 2024-10-01 PROCEDURE — 64447 NJX AA&/STRD FEMORAL NRV IMG: CPT | Performed by: ANESTHESIOLOGY

## 2024-10-01 PROCEDURE — 97116 GAIT TRAINING THERAPY: CPT

## 2024-10-01 PROCEDURE — 2700000000 HC OXYGEN THERAPY PER DAY

## 2024-10-01 PROCEDURE — 3700000001 HC ADD 15 MINUTES (ANESTHESIA): Performed by: ORTHOPAEDIC SURGERY

## 2024-10-01 PROCEDURE — 20985 CPTR-ASST DIR MS PX: CPT | Performed by: ORTHOPAEDIC SURGERY

## 2024-10-01 PROCEDURE — 7100000010 HC PHASE II RECOVERY - FIRST 15 MIN: Performed by: ORTHOPAEDIC SURGERY

## 2024-10-01 PROCEDURE — 73560 X-RAY EXAM OF KNEE 1 OR 2: CPT

## 2024-10-01 PROCEDURE — 97161 PT EVAL LOW COMPLEX 20 MIN: CPT

## 2024-10-01 DEVICE — KIT KNEE IMPL CAPPD K2 HEMI ADV CMTLS VE BEAR K2VEBZIMMERBIOMET: Type: IMPLANTABLE DEVICE | Site: KNEE | Status: FUNCTIONAL

## 2024-10-01 DEVICE — HEADLESS TROCHAR PIN 75MM
Type: IMPLANTABLE DEVICE | Site: KNEE | Status: FUNCTIONAL
Brand: ZUK

## 2024-10-01 RX ORDER — ASPIRIN 325 MG
325 TABLET ORAL DAILY
Qty: 30 TABLET | Refills: 0 | Status: SHIPPED | OUTPATIENT
Start: 2024-10-01

## 2024-10-01 RX ORDER — FENTANYL CITRATE 50 UG/ML
50 INJECTION, SOLUTION INTRAMUSCULAR; INTRAVENOUS EVERY 5 MIN PRN
Status: DISCONTINUED | OUTPATIENT
Start: 2024-10-01 | End: 2024-10-01 | Stop reason: HOSPADM

## 2024-10-01 RX ORDER — MIDAZOLAM HYDROCHLORIDE 1 MG/ML
INJECTION INTRAMUSCULAR; INTRAVENOUS
Status: COMPLETED
Start: 2024-10-01 | End: 2024-10-01

## 2024-10-01 RX ORDER — LIDOCAINE HYDROCHLORIDE 20 MG/ML
INJECTION, SOLUTION INTRAVENOUS
Status: DISCONTINUED | OUTPATIENT
Start: 2024-10-01 | End: 2024-10-01 | Stop reason: SDUPTHER

## 2024-10-01 RX ORDER — SODIUM CHLORIDE 0.9 % (FLUSH) 0.9 %
5-40 SYRINGE (ML) INJECTION EVERY 12 HOURS SCHEDULED
Status: DISCONTINUED | OUTPATIENT
Start: 2024-10-01 | End: 2024-10-01 | Stop reason: SDUPTHER

## 2024-10-01 RX ORDER — NALOXONE HYDROCHLORIDE 0.4 MG/ML
INJECTION, SOLUTION INTRAMUSCULAR; INTRAVENOUS; SUBCUTANEOUS PRN
Status: DISCONTINUED | OUTPATIENT
Start: 2024-10-01 | End: 2024-10-01 | Stop reason: HOSPADM

## 2024-10-01 RX ORDER — MEPERIDINE HYDROCHLORIDE 25 MG/ML
12.5 INJECTION INTRAMUSCULAR; INTRAVENOUS; SUBCUTANEOUS
Status: DISCONTINUED | OUTPATIENT
Start: 2024-10-01 | End: 2024-10-01 | Stop reason: HOSPADM

## 2024-10-01 RX ORDER — ROPIVACAINE HYDROCHLORIDE 5 MG/ML
INJECTION, SOLUTION EPIDURAL; INFILTRATION; PERINEURAL
Status: COMPLETED | OUTPATIENT
Start: 2024-10-01 | End: 2024-10-01

## 2024-10-01 RX ORDER — OXYCODONE AND ACETAMINOPHEN 5; 325 MG/1; MG/1
1-2 TABLET ORAL EVERY 6 HOURS PRN
Qty: 56 TABLET | Refills: 0 | Status: SHIPPED | OUTPATIENT
Start: 2024-10-01 | End: 2024-10-08

## 2024-10-01 RX ORDER — TRANEXAMIC ACID 650 MG/1
1950 TABLET ORAL
Status: DISCONTINUED | OUTPATIENT
Start: 2024-10-01 | End: 2024-10-01 | Stop reason: HOSPADM

## 2024-10-01 RX ORDER — SODIUM CHLORIDE 9 MG/ML
INJECTION, SOLUTION INTRAVENOUS PRN
Status: DISCONTINUED | OUTPATIENT
Start: 2024-10-01 | End: 2024-10-01 | Stop reason: HOSPADM

## 2024-10-01 RX ORDER — MAGNESIUM HYDROXIDE 1200 MG/15ML
LIQUID ORAL CONTINUOUS PRN
Status: COMPLETED | OUTPATIENT
Start: 2024-10-01 | End: 2024-10-01

## 2024-10-01 RX ORDER — ROCURONIUM BROMIDE 10 MG/ML
INJECTION, SOLUTION INTRAVENOUS
Status: DISCONTINUED | OUTPATIENT
Start: 2024-10-01 | End: 2024-10-01 | Stop reason: SDUPTHER

## 2024-10-01 RX ORDER — ONDANSETRON 2 MG/ML
INJECTION INTRAMUSCULAR; INTRAVENOUS
Status: DISCONTINUED | OUTPATIENT
Start: 2024-10-01 | End: 2024-10-01 | Stop reason: SDUPTHER

## 2024-10-01 RX ORDER — SODIUM CHLORIDE 0.9 % (FLUSH) 0.9 %
5-40 SYRINGE (ML) INJECTION PRN
Status: DISCONTINUED | OUTPATIENT
Start: 2024-10-01 | End: 2024-10-01 | Stop reason: SDUPTHER

## 2024-10-01 RX ORDER — ACETAMINOPHEN 500 MG
1000 TABLET ORAL ONCE
Status: COMPLETED | OUTPATIENT
Start: 2024-10-01 | End: 2024-10-01

## 2024-10-01 RX ORDER — SODIUM CHLORIDE 0.9 % (FLUSH) 0.9 %
5-40 SYRINGE (ML) INJECTION EVERY 12 HOURS SCHEDULED
Status: DISCONTINUED | OUTPATIENT
Start: 2024-10-01 | End: 2024-10-01 | Stop reason: HOSPADM

## 2024-10-01 RX ORDER — SODIUM CHLORIDE 9 MG/ML
INJECTION, SOLUTION INTRAVENOUS PRN
Status: DISCONTINUED | OUTPATIENT
Start: 2024-10-01 | End: 2024-10-01 | Stop reason: SDUPTHER

## 2024-10-01 RX ORDER — PROMETHAZINE HYDROCHLORIDE 25 MG/1
25 TABLET ORAL EVERY 6 HOURS PRN
Qty: 15 TABLET | Refills: 0 | Status: SHIPPED | OUTPATIENT
Start: 2024-10-01 | End: 2024-10-08

## 2024-10-01 RX ORDER — SODIUM CHLORIDE 9 MG/ML
INJECTION, SOLUTION INTRAVENOUS
Status: DISCONTINUED | OUTPATIENT
Start: 2024-10-01 | End: 2024-10-01 | Stop reason: SDUPTHER

## 2024-10-01 RX ORDER — BUPIVACAINE HYDROCHLORIDE 5 MG/ML
INJECTION, SOLUTION EPIDURAL; INTRACAUDAL PRN
Status: DISCONTINUED | OUTPATIENT
Start: 2024-10-01 | End: 2024-10-01 | Stop reason: ALTCHOICE

## 2024-10-01 RX ORDER — SODIUM CHLORIDE 0.9 % (FLUSH) 0.9 %
5-40 SYRINGE (ML) INJECTION PRN
Status: DISCONTINUED | OUTPATIENT
Start: 2024-10-01 | End: 2024-10-01 | Stop reason: HOSPADM

## 2024-10-01 RX ORDER — MIDAZOLAM HYDROCHLORIDE 1 MG/ML
2 INJECTION INTRAMUSCULAR; INTRAVENOUS ONCE
Status: DISCONTINUED | OUTPATIENT
Start: 2024-10-01 | End: 2024-10-01 | Stop reason: HOSPADM

## 2024-10-01 RX ORDER — MELOXICAM 7.5 MG/1
7.5 TABLET ORAL ONCE
Status: COMPLETED | OUTPATIENT
Start: 2024-10-01 | End: 2024-10-01

## 2024-10-01 RX ORDER — FENTANYL CITRATE 50 UG/ML
25 INJECTION, SOLUTION INTRAMUSCULAR; INTRAVENOUS EVERY 5 MIN PRN
Status: DISCONTINUED | OUTPATIENT
Start: 2024-10-01 | End: 2024-10-01 | Stop reason: HOSPADM

## 2024-10-01 RX ORDER — CEFADROXIL 500 MG/1
500 CAPSULE ORAL 2 TIMES DAILY
Qty: 20 CAPSULE | Refills: 0 | Status: SHIPPED | OUTPATIENT
Start: 2024-10-01 | End: 2024-10-01

## 2024-10-01 RX ORDER — FENTANYL CITRATE 50 UG/ML
INJECTION, SOLUTION INTRAMUSCULAR; INTRAVENOUS
Status: DISCONTINUED | OUTPATIENT
Start: 2024-10-01 | End: 2024-10-01 | Stop reason: SDUPTHER

## 2024-10-01 RX ORDER — SUCCINYLCHOLINE/SOD CL,ISO/PF 200MG/10ML
SYRINGE (ML) INTRAVENOUS
Status: DISCONTINUED | OUTPATIENT
Start: 2024-10-01 | End: 2024-10-01 | Stop reason: SDUPTHER

## 2024-10-01 RX ORDER — PROPOFOL 10 MG/ML
INJECTION, EMULSION INTRAVENOUS
Status: DISCONTINUED | OUTPATIENT
Start: 2024-10-01 | End: 2024-10-01 | Stop reason: SDUPTHER

## 2024-10-01 RX ORDER — ONDANSETRON 2 MG/ML
4 INJECTION INTRAMUSCULAR; INTRAVENOUS
Status: COMPLETED | OUTPATIENT
Start: 2024-10-01 | End: 2024-10-01

## 2024-10-01 RX ORDER — VANCOMYCIN HYDROCHLORIDE 1 G/20ML
INJECTION, POWDER, LYOPHILIZED, FOR SOLUTION INTRAVENOUS PRN
Status: DISCONTINUED | OUTPATIENT
Start: 2024-10-01 | End: 2024-10-01 | Stop reason: ALTCHOICE

## 2024-10-01 RX ORDER — CEFADROXIL 500 MG/1
500 CAPSULE ORAL 2 TIMES DAILY
Qty: 20 CAPSULE | Refills: 0 | Status: SHIPPED | OUTPATIENT
Start: 2024-10-01 | End: 2024-10-11

## 2024-10-01 RX ORDER — DEXAMETHASONE SODIUM PHOSPHATE 10 MG/ML
8 INJECTION, SOLUTION INTRAMUSCULAR; INTRAVENOUS ONCE
Status: COMPLETED | OUTPATIENT
Start: 2024-10-01 | End: 2024-10-01

## 2024-10-01 RX ORDER — SODIUM CHLORIDE 9 MG/ML
INJECTION, SOLUTION INTRAVENOUS CONTINUOUS
Status: DISCONTINUED | OUTPATIENT
Start: 2024-10-01 | End: 2024-10-01 | Stop reason: HOSPADM

## 2024-10-01 RX ADMIN — FENTANYL CITRATE 25 MCG: 50 INJECTION INTRAMUSCULAR; INTRAVENOUS at 10:25

## 2024-10-01 RX ADMIN — MELOXICAM 7.5 MG: 7.5 TABLET ORAL at 07:06

## 2024-10-01 RX ADMIN — FENTANYL CITRATE 50 MCG: 50 INJECTION INTRAMUSCULAR; INTRAVENOUS at 07:22

## 2024-10-01 RX ADMIN — MIDAZOLAM HYDROCHLORIDE: 1 INJECTION, SOLUTION INTRAMUSCULAR; INTRAVENOUS at 07:22

## 2024-10-01 RX ADMIN — ROCURONIUM BROMIDE 10 MG: 10 INJECTION, SOLUTION INTRAVENOUS at 07:35

## 2024-10-01 RX ADMIN — ONDANSETRON 4 MG: 2 INJECTION, SOLUTION INTRAMUSCULAR; INTRAVENOUS at 11:14

## 2024-10-01 RX ADMIN — TRANEXAMIC ACID 1950 MG: 650 TABLET ORAL at 07:06

## 2024-10-01 RX ADMIN — ROPIVACAINE HYDROCHLORIDE 30 ML: 5 INJECTION EPIDURAL; INFILTRATION; PERINEURAL at 07:22

## 2024-10-01 RX ADMIN — PROPOFOL 200 MG: 10 INJECTION, EMULSION INTRAVENOUS at 07:35

## 2024-10-01 RX ADMIN — ACETAMINOPHEN 1000 MG: 500 TABLET ORAL at 07:06

## 2024-10-01 RX ADMIN — FENTANYL CITRATE 50 MCG: 50 INJECTION INTRAMUSCULAR; INTRAVENOUS at 07:34

## 2024-10-01 RX ADMIN — SODIUM CHLORIDE: 9 INJECTION, SOLUTION INTRAVENOUS at 07:08

## 2024-10-01 RX ADMIN — ONDANSETRON 4 MG: 2 INJECTION INTRAMUSCULAR; INTRAVENOUS at 07:42

## 2024-10-01 RX ADMIN — DEXAMETHASONE SODIUM PHOSPHATE 8 MG: 10 INJECTION, SOLUTION INTRAMUSCULAR; INTRAVENOUS at 07:06

## 2024-10-01 RX ADMIN — SUGAMMADEX 200 MG: 100 INJECTION, SOLUTION INTRAVENOUS at 09:32

## 2024-10-01 RX ADMIN — LIDOCAINE HYDROCHLORIDE 80 MG: 20 INJECTION, SOLUTION INTRAVENOUS at 07:34

## 2024-10-01 RX ADMIN — ROCURONIUM BROMIDE 20 MG: 10 INJECTION, SOLUTION INTRAVENOUS at 08:39

## 2024-10-01 RX ADMIN — ROCURONIUM BROMIDE 10 MG: 10 INJECTION, SOLUTION INTRAVENOUS at 09:08

## 2024-10-01 RX ADMIN — ROCURONIUM BROMIDE 40 MG: 10 INJECTION, SOLUTION INTRAVENOUS at 07:41

## 2024-10-01 RX ADMIN — Medication 160 MG: at 07:36

## 2024-10-01 RX ADMIN — SODIUM CHLORIDE: 9 INJECTION, SOLUTION INTRAVENOUS at 07:28

## 2024-10-01 RX ADMIN — FENTANYL CITRATE 25 MCG: 50 INJECTION INTRAMUSCULAR; INTRAVENOUS at 10:18

## 2024-10-01 RX ADMIN — SODIUM CHLORIDE 1500 MG: 9 INJECTION, SOLUTION INTRAVENOUS at 07:09

## 2024-10-01 RX ADMIN — CEFAZOLIN 2000 MG: 2 INJECTION, POWDER, FOR SOLUTION INTRAMUSCULAR; INTRAVENOUS at 07:38

## 2024-10-01 ASSESSMENT — PAIN - FUNCTIONAL ASSESSMENT
PAIN_FUNCTIONAL_ASSESSMENT: 0-10
PAIN_FUNCTIONAL_ASSESSMENT: PREVENTS OR INTERFERES SOME ACTIVE ACTIVITIES AND ADLS

## 2024-10-01 ASSESSMENT — PAIN DESCRIPTION - DESCRIPTORS
DESCRIPTORS: ACHING
DESCRIPTORS: ACHING;STABBING

## 2024-10-01 ASSESSMENT — LIFESTYLE VARIABLES: SMOKING_STATUS: 0

## 2024-10-01 ASSESSMENT — PAIN SCALES - GENERAL
PAINLEVEL_OUTOF10: 3
PAINLEVEL_OUTOF10: 2
PAINLEVEL_OUTOF10: 0
PAINLEVEL_OUTOF10: 2
PAINLEVEL_OUTOF10: 6
PAINLEVEL_OUTOF10: 2
PAINLEVEL_OUTOF10: 0

## 2024-10-01 ASSESSMENT — PAIN DESCRIPTION - ORIENTATION: ORIENTATION: RIGHT

## 2024-10-01 ASSESSMENT — PAIN DESCRIPTION - LOCATION: LOCATION: KNEE

## 2024-10-01 ASSESSMENT — PAIN DESCRIPTION - PAIN TYPE: TYPE: SURGICAL PAIN

## 2024-10-01 NOTE — INTERVAL H&P NOTE
Patient seen and examined.  I have reviewed the history and physical and examined the patient and find no relevant changes.  Surgery plan reviewed.    Site marked and consent verified.  All questions answered and antibiotic verified.    Ready for right robotic assisted cementless total knee          Regulo Bahena MD, FAAOS  Board Certified Orthopaedic Surgeon  [facility]  [place] [facility] [place]    Phone:[phone]  Fax [phone]    10/01/24  7:00 AM

## 2024-10-01 NOTE — ANESTHESIA POSTPROCEDURE EVALUATION
Department of Anesthesiology  Postprocedure Note    Patient: Kali Weiss  MRN: [id number]  YOB: 1970  Date of evaluation: 10/1/2024    Procedure Summary       Date: 10/01/24 Room / Location: [address]    Anesthesia Start: 0729 Anesthesia Stop: 0957    Procedure: RIGHT KNEE TOTAL ARTHROPLASTY CEMENTLESS ROBOTIC ASSISTED (Right: Knee) Diagnosis:       Primary osteoarthritis of right knee      (Primary osteoarthritis of right knee [M17.11])    Surgeons: Regulo Bahena MD Responsible Provider: Ivan Zafar MD    Anesthesia Type: general, regional ASA Status: 2            Anesthesia Type: No value filed.    Malu Phase I: Malu Score: 10    Malu Phase II: Malu Score: 10    Anesthesia Post Evaluation    Patient location during evaluation: PACU  Patient participation: complete - patient participated  Level of consciousness: awake and alert  Pain score: 3  Airway patency: patent  Nausea & Vomiting: no nausea and no vomiting  Cardiovascular status: blood pressure returned to baseline  Respiratory status: acceptable  Hydration status: euvolemic  Pain management: adequate    No notable events documented.

## 2024-10-01 NOTE — BRIEF OP NOTE
Brief Postoperative Note      Patient: Kali Weiss  YOB: 1970  MRN: [id number]    Date of Procedure: 10/1/2024    Pre-Op Diagnosis Codes:      * Primary osteoarthritis of right knee [M17.11]    Post-Op Diagnosis: Same       Procedure(s):  RIGHT KNEE TOTAL ARTHROPLASTY CEMENTLESS ROBOTIC ASSISTED    Surgeon(s):  Abel Lawton MD    Assistant:  Surgical Assistant: Leatha Flores Hashim    Anesthesia: General    Estimated Blood Loss (mL): less than 100     Complications: None    Specimens:   * No specimens in log *    Implants:  Implant Name Type Inv. Item Serial No.  Lot No. LRB No. Used Action   PIN DRL L75MM DIA3.2MM HEX 2.5MM TRCR TIP DISP FOR PERSONA - [id number]  PIN DRL L75MM DIA3.2MM HEX 2.5MM TRCR TIP DISP FOR PERSONA  [employer]- [id number] Right 1 Implanted   COMPONENT PAT [id number] STD TI POLY TRABECULAR MTL - [id number]  COMPONENT PAT [id number] STD TI POLY TRABECULAR MTL  [employer]- [id number] Right 1 Implanted   PSN MC VE ASF R 10MM 8-11 GH - [id number]  PSN MC VE ASF R 10MM 8-11 GH  [employer]- [id number] Right 1 Implanted   COMPONENT FEM CR STD 11 RT KNEE MARVIN COCR STRL PERSONA LTX - [id number]  COMPONENT FEM CR STD 11 RT KNEE MARVIN COCR STRL PERSONA LTX  [employer]- [id number] Right 1 Implanted   BASEPLATE TIB KEELED 0 DEG G RT KNEE SPIKE OSSEOTI PERSONA - [id number]  BASEPLATE TIB KEELED 0 DEG G RT KNEE SPIKE OSSEOTI PERSONA  [employer]- [id number] Right 1 Implanted         Drains: * No LDAs found *    Findings:  Infection Present At Time Of Surgery (PATOS) (choose all levels that have infection present):  No infection present  Other Findings: severe tricompartmental osteoarthritis    Electronically signed by ABEL LAWTON MD on 10/1/2024 at 9:29 AM

## 2024-10-01 NOTE — PROGRESS NOTES
Discharge instructions reviewed with patient and family.  Patient and family verbalized understanding.  
FOCUS NOTES:    DOS: 10/1/024   Surgeon: Shruti      Date:  Follow up  Comment        PAT Initials    9/17/24 PAT call completed. Patient to see PCP, [facility]ia Soto [facility].  NT  9/18/24 H&P complete. EKG was done, tracing requested from office, will fax. NT  9/19/24 Labs completed, see Chart Review- reviewed by Dr. Bahena. EKG scanned into Epic, see Chart Review/Media.         GEORGE  
Patient alert, off oxygen, tolerating PO. Phase II initiated, VSS.    
Patient arrived to PACU bay 05, phase one initiated. Placed on bedside monitor, VSS. Report obtained from OR RN and anesthesia. Patient on O2 via NC at 3L. Assessment WNL. Warm blankets applied. Side rails in place, will monitor patient closely.   
Patient arrived to unit for procedure.  A&O, complains of pain 4/10 in right knee.  VSS, consents obtained. Patient to receive nerve block. Matthias Bridges and myself present.  Correct patient, consents, and surgical site verified. Patient placed on monitor and 2L 02 via NC placed on patient. VSS throughout.  Report and handoff given to BHUPINDER Hunt.  
safekeeping and leave them with the person you came with.        Our goal is to provide you with safe and excellent care. Please contact pre-admission testing if you have any further questions. (Please note, these are generalized instructions for all surgical cases. You may be provided with more specific instructions according to your surgeon.)     [address] [place] [address]     Hospital: [phone]    Pre-Admission [phone]  
MODIndep with gait 150feet with FWW.   Patient demonstrates  2 STAIRS negotiation with Independent/Modified Independent and use of Rolling Walker  [x] GOAL MET Patient demonstrated progression during session with fading assist and cues to Independent/Modified Independent up<>down 2 curb steps with use of Rolling Walker  Patient  will perform 5-10 reps of BLE TJR HOME EXERCISES with progression from cues and AAROM to indep and/or family safe to assist and family provided with written TJR hep.    [x] GOAL MET Patient progressed during session with fading assist and cues to perform 5-10 reps of BLE TJR HOME EXERCISES to indep and/or family safe to assist and family provided with written TJR   Patient provided CAR TRANSFER TRAINING with written and verbal instruction and verbalizes understanding or demonstrates Supervision/LINDSEY with simulated car transfer   [x]GOAL MET: Patient provided Car Transfer Training with written and verbal instruction and verbalizes understanding and  demonstrates LINDSEY with simulated car transfer.    Patient specific goal: \"Read magazines while I am off and catch up on things and then work from home. \"    Dispo: At end of session, pt Seated in chair Discussed with RN Rn prepping patient for DC from hospital.     Therapy Time:  Time In: 11:25  Time Out: 12:11    Total Treatment Minutes:  46 minutes  30 minute tx (1 gait and 1 te) (16 minute eval)      Electronically signed by Sarah Mackey PT on 10/1/2024 at 11:28 AM

## 2024-10-01 NOTE — ANESTHESIA PRE PROCEDURE
\"[id number]\"   Type & Screen (If Applicable)  No results found for: \"LABABO\"                         BMI: Wt Readings from Last 3 Encounters:       NPO Status:   Date of last liquid consumption: 09/30/24   Time of last liquid consumption: 2100   Date of last solid food consumption: 09/30/24      Time of last solid consumption: 2000       Anesthesia Evaluation  Patient summary reviewed   no history of anesthetic complications:   Airway: Mallampati: II  TM distance: >3 FB   Neck ROM: full  Mouth opening: > = 3 FB   Dental:      Comment: No loose teeth    Pulmonary: breath sounds clear to auscultation      (-) COPD, asthma and not a current smoker                           Cardiovascular:  Exercise tolerance: good (>4 METS)      (-) hypertension, past MI, CABG/stent and no hyperlipidemia        Rate: normal                    Neuro/Psych:      (-) seizures, TIA and CVA           GI/Hepatic/Renal:        (-) GERD, liver disease and no renal disease       Endo/Other:    (+) : arthritis:..    (-) diabetes mellitus, hypothyroidism               Abdominal:             Vascular:     - DVT and PE.      Other Findings:             Anesthesia Plan      general and regional     ASA 2     (Right adductor canal block)  Induction: intravenous.    MIPS: Postoperative opioids intended and Prophylactic antiemetics administered.  Anesthetic plan and risks discussed with patient.      Plan discussed with CRNA.          Post-op pain plan if not by surgeon: single peripheral nerve block            This pre-anesthesia assessment may be used as a history and physical.    DOS STAFF ADDENDUM:    Pt seen and examined, chart reviewed (including anesthesia, drug and allergy history).  No interval changes to history and physical examination.  Anesthetic plan, risks, benefits, alternatives, and personnel involved discussed with patient.  Patient verbalized an understanding and agrees to proceed.      Ivan Zafar MD  October 1, 2024  6:47

## 2024-10-01 NOTE — ANESTHESIA PROCEDURE NOTES
Peripheral Block    Patient location during procedure: PACU  Reason for block: post-op pain management and at surgeon's request  Start time: 10/1/2024 7:22 AM  Staffing  Performed: anesthesiologist   Anesthesiologist: Ivan Zafar MD  Performed by: Ivan Zafar MD  Authorized by: Ivan Zafar MD    Preanesthetic Checklist  Completed: patient identified, IV checked, site marked, risks and benefits discussed, surgical/procedural consents, equipment checked, pre-op evaluation, timeout performed, anesthesia consent given, oxygen available and monitors applied/VS acknowledged  Peripheral Block   Patient position: supine  Prep: ChloraPrep  Provider prep: mask and sterile gloves  Patient monitoring: cardiac monitor, continuous pulse ox, frequent blood pressure checks and IV access  Block type: Femoral  Adductor canal  Laterality: right  Injection technique: single-shot  Guidance: ultrasound guided  Local infiltration: lidocaine  Infiltration strength: 1 %  Local infiltration: lidocaine    Needle   Needle type: combined needle/nerve stimulator   Needle gauge: 22 G  Needle localization: ultrasound guidance  Needle length: 5 cm  Assessment   Injection assessment: negative aspiration for heme, no paresthesia on injection and local visualized surrounding nerve on ultrasound  Slow fractionated injection: yes  Hemodynamics: stable    Additional Notes  Sartorius and Vastus Medialis Muscle, Femoral artery and Saphenous nerve are identified; the tip of the needle and the spread of the local anesthetic around the Saphenous nerve are visualized. The Saphenous nerve appeared to be anatomically normal and there were no abnormal pathologically findings seen.   Medications Administered  ropivacaine (NAROPIN) injection 0.5% - Perineural   30 mL - 10/1/2024 7:22:00 AM

## 2024-10-01 NOTE — DISCHARGE INSTRUCTIONS
Individual Total Knee Replacement Patient Discharge Instructions        Follow up with Dr. Bahena in 2 weeks.    Activity:  Driving: Check with MD first  Bath/Shower: May shower with waterproof dressing on.   No tub baths until incision is completely healed  Walking: Walker  Steps:  As instructed in physical therapy.  Weight bearing status of the operative leg: full  Knee brace:None    Nutrition: Home diet    Incision/Wound Care:    Keep incision dry.  Leave the tegederm and gauze dressing in place until it comes off - usually around 12-14 days.  You may shower with the sealed Mepliex dressing. Once the dressing is off, you may continue to shower as long as there is no drainage.  Most likely you will have a tissue adhesive surgical glue over the incision - Dermabond Primeo (dermabond mesh) -  After 3 weeks vaseline to be applied to surgical site to remove dermabond tape. This is done by rolling the tape top to bottom or bottom to top-- NOT side to side. Call the office with questions    Do not place ice packs directly onto incision/skin, place light dressing (gauze/kerlix) between skin and ice packs.    PAIN MANAGEMENT: EXPECT to have pain when your numbing medicine wears off.  It will eventually subside.   Take your prescribed pain medication as directed.   If you need more pain medicine, it is your responsibility to call the office during office hours and request them.    Use your ice pack for at least 20 minutes every hour after exercise, for swelling and for pain relief.      Call the doctor for - signs of infection: fever above 101.0 degrees, redness or drainage from the incision.  You should look at your incision daily.      Individualized instructions as appropriate:  Call the doctor for: pain unrelieved by pain medications, calf pain, tenderness or swelling, questions or problems.  Adverse reaction to medication, temperature above 101.0 degrees x2, 3-4 hours apart - any questions.  Continue your exercises as

## 2024-10-01 NOTE — PLAN OF CARE
Problem: Physical Therapy - Adult  Goal: By Discharge: Performs mobility at highest level of function for planned discharge setting.  See evaluation for individualized goals.  Outcome: Progressing, goals met.  Sarah Mackey, PT

## 2024-10-01 NOTE — OP NOTE
Operative Note      Patient: Kali Weiss  YOB: 1970  MRN: [id number]    Date of Procedure: 10/1/2024    Pre-Op Diagnosis Codes:      * Primary osteoarthritis of right knee [M17.11]    Post-Op Diagnosis: Same       Procedure(s):  RIGHT KNEE TOTAL ARTHROPLASTY CEMENTLESS ROBOTIC ASSISTED    Surgeon(s):  Regulo Bahena MD    Assistant:   Surgical Assistant: Leatha Flores Hashim    Anesthesia: General    Estimated Blood Loss (mL): less than 100     Complications: None    Specimens:   * No specimens in log *    Implants:  Implant Name Type Inv. Item Serial No.  Lot No. LRB No. Used Action   PIN DRL L75MM DIA3.2MM HEX 2.5MM TRCR TIP DISP FOR PERSONA - [id number]  PIN DRL L75MM DIA3.2MM HEX 2.5MM TRCR TIP DISP FOR PERSONA  [employer]- [id number] Right 1 Implanted   COMPONENT PAT [id number] STD TI POLY TRABECULAR MTL - [id number]  COMPONENT PAT [id number] STD TI POLY TRABECULAR MTL  [employer]- [id number] Right 1 Implanted   PSN MC VE ASF R 10MM 8-11 GH - [id number]  PSN MC VE ASF R 10MM 8-11 GH  [employer]- [id number] Right 1 Implanted   COMPONENT FEM CR STD 11 RT KNEE MARVIN COCR STRL PERSONA LTX - [id number]  COMPONENT FEM CR STD 11 RT KNEE MARVIN COCR STRL PERSONA LTX  [employer]- [id number] Right 1 Implanted   BASEPLATE TIB KEELED 0 DEG G RT KNEE SPIKE OSSEOTI PERSONA - [id number]  BASEPLATE TIB KEELED 0 DEG G RT KNEE SPIKE OSSEOTI PERSONA  [employer]- [id number] Right 1 Implanted         Drains: * No LDAs found *    Findings:  Infection Present At Time Of Surgery (PATOS) (choose all levels that have infection present):  No infection present  Other Findings: severe tricompartmental osteoarthritis, lateral release required.  Good bone quality for cementless fixation of all components.    Detailed Description of Procedure:   Findings:   1. End stage OA      Indications:   The patient has been

## 2024-10-02 ENCOUNTER — TELEPHONE (OUTPATIENT)
Dept: ORTHOPEDIC SURGERY | Age: 54
End: 2024-10-02

## 2024-10-02 DIAGNOSIS — Z96.651 TOTAL KNEE REPLACEMENT STATUS, RIGHT: Primary | ICD-10-CM

## 2024-10-02 NOTE — TELEPHONE ENCOUNTER
Spoke with patient.    Incision status: No drainage or redness    Edema/Swelling/Teds: He is having a lot of swelling in his right leg today since he removed his ace wrap.  He is using his ice machine, and we discussed wearing WANDA hose.    Pain level and status: Rates current pain at 6-7/10.    Use of pain medications: Percocet 5/325.  He was taking 1 PO q6h, and we discussed taking 2 at his next dose to help him get his pain level down some, and then he can resume taking 1 pill q6h. He is having some nausea so he is taking Phenergan q6h prn.    Blood thinner: Aspirin 325 mg QD    Bowels: No BM since surgery.  We discussed drinking plenty of water, and drinking some apple juice to avoid constipation.  He is going to take a daily stool softener as well.    Home Care Agency active: He reports that the original [facility] doesn't come to his area of town, so a new order was placed for [facility].  They are supposed to come out tomorrow to evaluate him.    Outpatient therapy: Hasn't started yet    Do you have all of your medications: Yes    Changes in medications: No    Instructed pt to call Nurse Navigator or surgeon's office with any questions or concerns.     Follow up appointments:    Future Appointments   Date Time Provider Department Center   10/16/2024  2:30 PM Regulo Ba[facility]a MD [facility]

## 2024-10-16 ENCOUNTER — OFFICE VISIT (OUTPATIENT)
Age: 54
End: 2024-10-16

## 2024-10-16 VITALS — WEIGHT: 185 LBS | HEIGHT: 71 IN | BODY MASS INDEX: 25.9 KG/M2

## 2024-10-16 DIAGNOSIS — Z96.651 TOTAL KNEE REPLACEMENT STATUS, RIGHT: Primary | ICD-10-CM

## 2024-10-16 DIAGNOSIS — E55.9 VITAMIN D DEFICIENCY: ICD-10-CM

## 2024-10-16 PROCEDURE — 99024 POSTOP FOLLOW-UP VISIT: CPT | Performed by: ORTHOPAEDIC SURGERY

## 2024-10-18 ENCOUNTER — TELEPHONE (OUTPATIENT)
Dept: ORTHOPEDIC SURGERY | Age: 54
End: 2024-10-18

## 2024-10-22 RX ORDER — MULTIVIT-MIN/IRON/FOLIC ACID/K 18-600-40
2000 CAPSULE ORAL DAILY
Qty: 30 CAPSULE | Refills: 2 | Status: SHIPPED | OUTPATIENT
Start: 2024-10-22 | End: 2025-01-20

## 2024-10-22 NOTE — PROGRESS NOTES
Patient Name: Kali Weiss  Medical Record Number: [id number]  YOB: 1970  Date of Encounter: 10/16/2024     Chief Complaint   Patient presents with    Post-Op Check     Right TKA DOS 10/1/24       Total Knee Follow-up  Kali Weiss is here for 2 weeks post right total knee arthroplasty follow-up. Pain is controlled with intermittent use of percocet. The patient denies fever, wound drainage, increasing redness, pus, increasing pain, increasing swelling. Post op problems reported: none. He is ambulating independently today.     He is working with physical therapy.    Patient is still taking Vitamin D supplementation. DVT prophylaxis is ASA and is complete.     The patient's  past medical history, medications, allergies,  family history, social history, and review of systems have been reviewed, and dated and are recorded in the chart under the 'MEDIA\" tab.    Physical Exam:   Mr. Kali Weiss appears well, he is in no apparent distress, he demonstrates appropriate mood & affect. He is alert and oriented to person, place and time.    Ht 1.803 m (5' 11\")   Wt 83.9 kg (185 lb)   BMI 25.80 kg/m²     Right Knee: He has mild swelling which is resolving as expected. The incision is clean, dry, intact and nontender and without erythema or induration. Range of motion from 0 to 105 degrees. He has mild pain with range of motion of the knee. Patient has 4-/5 motor strength with flexion and extension of the right knee. He is able to do straight leg raises without difficulty. There is mild right lower extremity edema. He is neurovascularly intact distally.     Radiology:  X-rays obtained and reviewed in office:  Views: 3 views right knee.  Impression: Stable cementless total knee arthroplasty with satisfactory alignment. There is no evidence of loosening or subsidence.    Orders:  Orders Placed This Encounter   Procedures    XR KNEE RIGHT (3 VIEWS)     Rm 5     Standing Status:   Future     Number of

## 2024-11-06 ENCOUNTER — PATIENT MESSAGE (OUTPATIENT)
Age: 54
End: 2024-11-06

## 2024-11-06 DIAGNOSIS — Z96.651 TOTAL KNEE REPLACEMENT STATUS, RIGHT: Primary | ICD-10-CM

## 2024-11-14 ENCOUNTER — OFFICE VISIT (OUTPATIENT)
Age: 54
End: 2024-11-14

## 2024-11-14 VITALS — WEIGHT: 185 LBS | HEIGHT: 71 IN | BODY MASS INDEX: 25.9 KG/M2

## 2024-11-14 DIAGNOSIS — Z96.651 TOTAL KNEE REPLACEMENT STATUS, RIGHT: Primary | ICD-10-CM

## 2024-11-14 PROCEDURE — 99024 POSTOP FOLLOW-UP VISIT: CPT | Performed by: ORTHOPAEDIC SURGERY

## 2024-11-14 RX ORDER — TERBINAFINE HYDROCHLORIDE 250 MG/1
250 TABLET ORAL DAILY
COMMUNITY

## 2024-11-14 NOTE — PROGRESS NOTES
Patient Name: Kali Weiss  Medical Record Number: [id number]  YOB: 1970  Date of Encounter: 11/14/2024     Chief Complaint   Patient presents with    Post-Op Check     Sx 10/01/24 Right knee       Total Knee Follow-up  Kali Weiss is here for 6 weeks post right total knee arthroplasty follow-up. Pain is controlled with no medications. The patient denies fever, wound drainage, increasing redness, pus, increasing pain, increasing swelling. Post op problems reported: still has some sleep disturbance. He is ambulating independently.     He is working with physical therapy.    The patient's  past medical history, medications, allergies,  family history, social history, and review of systems have been reviewed, and dated and are recorded in the chart under the 'MEDIA\" tab.    Physical Exam:   Mr. Kali Weiss appears well, he is in no apparent distress, he demonstrates appropriate mood & affect. He is alert and oriented to person, place and time.    Ht 1.803 m (5' 11\")   Wt 83.9 kg (185 lb) Comment: Patient reported weight  BMI 25.80 kg/m²     Right Knee: He has minimal swelling which is resolving as expected. The incision is clean, dry, intact and nontender and without erythema or induration. Range of motion from 0 to 115 degrees. He has no pain with range of motion of the knee. Patient has 4+/5 motor strength with flexion and extension of the right knee. He is able to do straight leg raises without difficulty. There is minimal right lower extremity edema. He is neurovascularly intact distally.     Radiology:  X-rays obtained and reviewed in office:  Views: 3 views right knee.  Impression: Stable total knee arthroplasty with satisfactory alignment and interval osseous integration. There is no evidence of loosening or subsidence.    Orders:  Orders Placed This Encounter   Procedures    XR KNEE RIGHT (MIN 4 VIEWS)     Rm 1     Standing Status:   Future     Number of Occurrences:   1     Standing

## 2025-01-02 ENCOUNTER — OFFICE VISIT (OUTPATIENT)
Age: 55
End: 2025-01-02

## 2025-01-02 VITALS — HEIGHT: 71 IN | BODY MASS INDEX: 25.9 KG/M2 | WEIGHT: 185 LBS

## 2025-01-02 DIAGNOSIS — Z96.651 TOTAL KNEE REPLACEMENT STATUS, RIGHT: Primary | ICD-10-CM

## 2025-01-02 PROCEDURE — 99024 POSTOP FOLLOW-UP VISIT: CPT | Performed by: ORTHOPAEDIC SURGERY

## 2025-01-02 NOTE — PROGRESS NOTES
Kali Weiss  [id number]  Encounter Date: 1/2/2025  YOB: 1970    Chief Complaint   Patient presents with    Follow-up     Total knee replacement status, right       History:Mr. Kali Weiss is here in follow up regarding his right TKA.  Pain is minimal.  He is working with PT and doing well with the program.  He gets an occasional click but no pain or instability.  Overall he is quite pleased with the results.     Exam:  Ht 1.803 m (5' 11\")   Wt 83.9 kg (185 lb)   BMI 25.80 kg/m²   General: Alert and oriented x3, No acute distress, Cooperative and conversant.  Mood and affect are appropriate.  Right knee: Incision area remains well-healed.  Trace soft tissue swelling compared to contralateral side as expected.  He has full extension with flexion comfortable to 135 degrees.  No gross instability ligamentous assessment.  Quad strength 4+ to 5 -/5.  Sensation to light touch grossly present throughout the right lower extremity  Capillary refill < 2 seconds   Skin: no erythema, lesions or rashes  No signs / symptoms of DVT / PE or infection    X-rays: 3 views right knee (AP, lateral and sunrise) show stable alignment of his cementless total knee arthroplasty with good interval bony ingrowth at the interface.  No significant joint effusion.    Assessment:   Diagnosis Orders   1. Total knee replacement status, right  XR KNEE RIGHT (3 VIEWS)          Orders  Orders Placed This Encounter   Procedures    XR KNEE RIGHT (3 VIEWS)       Plan:  We discussed treatment options today.  He is doing quite well.  He will finish off his planned physical therapy.  He has excellent range of motion and should have continued return of strength with functional activities.  At this point I would recommend a follow-up visit in 6 months for repeat x-rays on his cementless implant and after that surveillance x-rays every 2 to 3 years.  He was reminded about antibiotic prophylaxis for dental cleanings and other procedures for the

## 2025-02-08 NOTE — PROGRESS NOTES
negative Kadi's   negative Homans   Capillary refill is brisk sensation is intact.    Skin: There are no rashes, ulcerations or lesions.    Gait: Tandem gait throughout the office today.    Radiology:     X-rays obtained today and reviewed in office:  Views 4 Left  Knee with comparison AP, flexion PA and skyline views of the right knee  Impression No evidence for acute fracture or subluxation / dislocation. No lytic or blastic lesions within the metaphyseal regions of either knee.  Stable right total knee arthroplasty.  Left knee with mild narrowing of the patellofemoral and medial compartment joint spaces consistent with Kellgren-Ba grade 1-2 degenerative changes.        Impression:  Encounter Diagnoses   Name Primary?    Primary osteoarthritis of left knee Yes    Chronic pain of left knee        Office Procedures:  Orders Placed This Encounter   Procedures    XR KNEE LEFT (MIN 4 VIEWS)     Rm 4     Standing Status:   Future     Number of Occurrences:   1     Standing Expiration Date:   3/5/2025    MONOVISC INJECTION (For Auth/Precert)     Standing Status:   Future     Standing Expiration Date:   2/12/2026       Treatment Plan:   We discussed treatment options.  He will continue with his current medications and strengthening exercises.  He has done well with hyaluronic acid injections in the past.  We will resubmit for Monovisc injection for his left knee.  He will return to the office once we have approval to receive the injection.    Kali Weiss understands and accepts this course of care

## 2025-02-09 SDOH — HEALTH STABILITY: PHYSICAL HEALTH: ON AVERAGE, HOW MANY DAYS PER WEEK DO YOU ENGAGE IN MODERATE TO STRENUOUS EXERCISE (LIKE A BRISK WALK)?: 4 DAYS

## 2025-02-09 SDOH — HEALTH STABILITY: PHYSICAL HEALTH: ON AVERAGE, HOW MANY MINUTES DO YOU ENGAGE IN EXERCISE AT THIS LEVEL?: 50 MIN

## 2025-02-12 ENCOUNTER — OFFICE VISIT (OUTPATIENT)
Age: 55
End: 2025-02-12
Payer: COMMERCIAL

## 2025-02-12 VITALS — BODY MASS INDEX: 24.92 KG/M2 | WEIGHT: 178 LBS | HEIGHT: 71 IN

## 2025-02-12 DIAGNOSIS — M25.562 CHRONIC PAIN OF LEFT KNEE: ICD-10-CM

## 2025-02-12 DIAGNOSIS — G89.29 CHRONIC PAIN OF LEFT KNEE: ICD-10-CM

## 2025-02-12 DIAGNOSIS — M17.12 PRIMARY OSTEOARTHRITIS OF LEFT KNEE: Primary | ICD-10-CM

## 2025-02-12 PROCEDURE — 99212 OFFICE O/P EST SF 10 MIN: CPT | Performed by: ORTHOPAEDIC SURGERY

## 2025-02-12 RX ORDER — EPINEPHRINE 0.3 MG/.3ML
0.3 INJECTION SUBCUTANEOUS
COMMUNITY
Start: 2025-01-31

## 2025-02-20 ENCOUNTER — OFFICE VISIT (OUTPATIENT)
Age: 55
End: 2025-02-20

## 2025-02-20 VITALS — WEIGHT: 178 LBS | BODY MASS INDEX: 24.92 KG/M2 | HEIGHT: 71 IN

## 2025-02-20 DIAGNOSIS — M17.12 PRIMARY OSTEOARTHRITIS OF LEFT KNEE: Primary | ICD-10-CM

## 2025-02-20 RX ORDER — PROPRANOLOL HCL 20 MG
20 TABLET ORAL DAILY
COMMUNITY
Start: 2025-02-16

## 2025-02-20 NOTE — PROGRESS NOTES
Subjective:  left knee pain.   He is here for a Monovisc injection for the  left knee(s).     Objective:   Height 1.803 m (5' 11\"), weight 80.7 kg (178 lb).    There is a mild joint effusion noted of the left knee(s). There is mild pain with range of motion testing. There is no significant  instability noted.    Neuro exam grossly intact both lower extremities. Intact sensation to light touch. Motor exam 4+ to 5/5 in all major motor groups.  Negative Campbell's sign.    Skin is warm, dry and intact with out erythema or significant increased temperature around the knee joint(s).     Assessment:  Degenerative Joint Disease of the left Knee(s).    Plan:  Discussed Monovisc injection(s) including all  risks and benefits including increased pain, drug reaction, infection, bleeding, lack of improvement and  neurovascular injury.   All the questions were answered.    PROCEDURE NOTE:  PRE-PROCEDURE DIAGNOSIS: left DJD knee(s)  POST-PROCEDURE DIAGNOSIS: left DJD knee(s)    With the patient's permission, his left  knee(s) was prepped in standard sterile fashion with Betadine and  Alcohol. The prefilled Monovisc injection was placed into the left  superiolateral joint space compartment(s) without difficulty using careful aspiration and injection technique.  The he tolerated the procedure(s) well without difficulty.  A band-aid was applied.     POST-PROCEDURE INSTRUCTIONS GIVEN TO PATIENT: He was advised to ice the knee for 15-20 minutes to relieve any injection site related pain. Decrease activity for the next 24 to 48 hours. May use prescription or OTC pain relievers as needed      FOLLOW-UP:   As directed or call or return to clinic if these symptoms worsen or fail to improve as anticipated. If at any time you are concerned you may contact the office for further instructions or care.

## 2025-07-07 ENCOUNTER — OFFICE VISIT (OUTPATIENT)
Age: 55
End: 2025-07-07
Payer: COMMERCIAL

## 2025-07-07 VITALS — BODY MASS INDEX: 24.92 KG/M2 | HEIGHT: 71 IN | WEIGHT: 178 LBS

## 2025-07-07 DIAGNOSIS — Z96.651 TOTAL KNEE REPLACEMENT STATUS, RIGHT: Primary | ICD-10-CM

## 2025-07-07 PROCEDURE — 99213 OFFICE O/P EST LOW 20 MIN: CPT | Performed by: ORTHOPAEDIC SURGERY

## 2025-07-07 RX ORDER — AMOXICILLIN 500 MG/1
CAPSULE ORAL
Qty: 4 CAPSULE | Refills: 3 | Status: SHIPPED | OUTPATIENT
Start: 2025-07-07

## 2025-07-07 NOTE — PROGRESS NOTES
Kali Weiss  [id number]  Encounter Date: 7/7/2025  YOB: 1970    Chief Complaint   Patient presents with    Follow-up     Right knee, TKD 10/01/24       History:Mr. Kali Weiss is here in follow up   History of Present Illness  The patient presents for evaluation of right knee pain.    He reports satisfactory mobility in his right knee, which was replaced in October 2024. He is curious about the potential risks associated with falling on his newly replaced knee and the expected duration of the healing process. He has a dental appointment scheduled within the next 3 to 4 months.    His left knee, which received an injection in August 2024, occasionally experiences stiffness but overall remains functional.      Exam:  Ht 1.803 m (5' 11\")   Wt 80.7 kg (178 lb) Comment: Patient reported  BMI 24.83 kg/m²   General: Alert and oriented x3, No acute distress, Cooperative and conversant.  Obesity Absent   Mood and affect are appropriate.  Right knee: Incision area remains well-healed.  He has full functional range of motion with no pain.  Quad definition improving although he still has decline in bulk compared to his normal side.    Results  Imaging   - Three views AP, lateral and sunrise right knee X-ray: Stable cementless right total knee arthroplasty components with normal component bone interface      Assessment:   Diagnosis Orders   1. Total knee replacement status, right  XR KNEE RIGHT (3 VIEWS)          Orders  Orders Placed This Encounter   Procedures    XR KNEE RIGHT (3 VIEWS)       Plan:  Assessment & Plan  1. Right knee pain: Status post right total knee arthroplasty from 10/2024. X-rays of the right knee show stable cementless total knee arthroplasty components with a good component bone interface. Pain is minimal, averaging 1/10. The bone has integrated well with the components, and there are no concerns about long-term complications at this time.  - Avoid significant falls or motor vehicle

## (undated) DEVICE — GLOVE SURG SZ 85 L12IN FNGR ORTHO 126MIL CRM LTX FREE

## (undated) DEVICE — T-DRAPE,EXTREMITY,STERILE: Brand: MEDLINE

## (undated) DEVICE — DRAPE ROSA RBTC UNT 20 DROP

## (undated) DEVICE — PAD ABSRB W8XL10IN ABD HYDROPHOBIC NONWOVEN THCK LAYR CELOS

## (undated) DEVICE — NEEDLE SPNL L3.5IN PNK HUB S STL REG WALL FIT STYL W/ QNCKE

## (undated) DEVICE — PADDING CAST W6INXL4YD ST COT RAYON MICROPLEATED HIGHLY

## (undated) DEVICE — ADHESIVE SKIN CLOSURE WND 8.661X1.5 IN 22 CM LIQUIBAND SECUR

## (undated) DEVICE — 4-PORT MANIFOLD: Brand: NEPTUNE 2

## (undated) DEVICE — SUTURE VICRYL + 1 L27IN ABSRB UD CT-1 L36MM 1/2 CIR TAPR PNT VCP261H

## (undated) DEVICE — INSTRUMENT SCREW BNE L25MM DIA2.5MM KNEE FULL THRD HEX FEM PERSONA

## (undated) DEVICE — PIN FIX STERILE L150MM DIA3.2MM FLUT

## (undated) DEVICE — DECANTER BAG 9": Brand: MEDLINE INDUSTRIES, INC.

## (undated) DEVICE — SCREW BNE HD 3.5X48 MM HEX PERSONA (NOT IMPLANTED)

## (undated) DEVICE — STERILE PATIENT PROTECTIVE PAD FOR IMP® KNEE POSITIONERS & COHESIVE WRAP (10 / CASE): Brand: DE MAYO KNEE POSITIONER®

## (undated) DEVICE — PIN FIX STERILE L80MM DIA3.2MM FLUT CAS

## (undated) DEVICE — 60-7070-106 TRNQT,DPSB,PLC BROWN: Brand: MEDLINE RENEWAL

## (undated) DEVICE — BOOT POS LEG DEMAYO

## (undated) DEVICE — Device

## (undated) DEVICE — STANDARD HYPODERMIC NEEDLE,POLYPROPYLENE HUB: Brand: MONOJECT

## (undated) DEVICE — DRESSING FOAM W3XL3IN GENTLE SIL FACE BORD ADH PD SUP ABSRB

## (undated) DEVICE — DRESSING CNTCT 4X12IN IS THERABOND 3D

## (undated) DEVICE — MERCY HEALTH WEST TURNOVER: Brand: MEDLINE INDUSTRIES, INC.

## (undated) DEVICE — INSTRUMENT KIT ORTHOPEDIC KNEE NAVITRACK

## (undated) DEVICE — SYRINGE MED 10ML LUERLOCK TIP W/O SFTY DISP

## (undated) DEVICE — GARMENT COMPR STD FOR 17IN CALF UNIF THER FLOTRN

## (undated) DEVICE — GLOVE SURG SZ 85 L12IN FNGR THK94MIL STD WHT LTX FREE

## (undated) DEVICE — SUTURE ABSORBABLE MONOFILAMENT 1 OS-8 36 CM 40 MM VIO PDS +

## (undated) DEVICE — GLOVE SURG SZ 85 L12IN FNGR THK79MIL GRN LTX FREE

## (undated) DEVICE — PAD,ABDOMINAL,8"X7.5",STERILE,LF,1/PK: Brand: MEDLINE

## (undated) DEVICE — HANDPIECE SET WITH HIGH FLOW TIP AND SUCTION TUBE: Brand: INTERPULSE

## (undated) DEVICE — SUTURE VICRYL + SZ 0 L18IN ABSRB UD L36MM CT-1 1/2 CIR VCP840D

## (undated) DEVICE — PADDING CAST W6INXL4YD COT LO LINTING WYTEX

## (undated) DEVICE — ELECTRODE PT RET AD L9FT HI MOIST COND ADH HYDRGEL CORDED

## (undated) DEVICE — SPONGE GZ W4XL4IN COT 12 PLY TYP VII WVN C FLD DSGN STERILE

## (undated) DEVICE — SPONGE LAP W18XL18IN WHT COT 4 PLY FLD STRUNG RADPQ DISP ST 2 PER PACK

## (undated) DEVICE — CONTAINER,SPECIMEN,OR STERILE,4OZ: Brand: MEDLINE

## (undated) DEVICE — SOLUTION IRRIG 3000ML 0.9% SOD CHL USP UROMATIC PLAS CONT

## (undated) DEVICE — DUAL CUT SAGITTAL BLADE

## (undated) DEVICE — SYRINGE MED 50ML LUERLOCK TIP

## (undated) DEVICE — SOLUTION IRRIG 1000ML 0.9% SOD CHL USP POUR PLAS BTL

## (undated) DEVICE — SUTURE VICRYL + SZ 2-0 L18IN ABSRB UD CT1 L36MM 1/2 CIR VCP839D

## (undated) DEVICE — GLOVE SURG SZ 8.5 L11.85IN FNGR THK9.8MIL STRW LTX POLYMER

## (undated) DEVICE — SUTURE MONOCRYL + SZ 4-0 L18IN ABSRB UD L19MM PS-2 3/8 CIR MCP496G

## (undated) DEVICE — 3M™ STERI-DRAPE™ U-DRAPE 1015: Brand: STERI-DRAPE™

## (undated) DEVICE — ANTI-EMBOLISM STOCKINGS,THIGH LENGTH,LARGE-LONG-SIZE J: Brand: T.E.D.

## (undated) DEVICE — 450 ML BOTTLE OF 0.05% CHLORHEXIDINE GLUCONATE IN 99.95% STERILE WATER FOR IRRIGATION, USP AND APPLICATOR.: Brand: IRRISEPT ANTIMICROBIAL WOUND LAVAGE

## (undated) DEVICE — YANKAUER,OPEN TIP,W/O VENT,STERILE: Brand: MEDLINE INDUSTRIES, INC.

## (undated) DEVICE — GOWN SIRUS NONREIN XL W/TWL: Brand: MEDLINE INDUSTRIES, INC.

## (undated) DEVICE — BANDAGE,ELASTIC,ESMARK,STERILE,6"X9',LF: Brand: MEDLINE

## (undated) DEVICE — BANDAGE COMPR W6INXL10YD ST M E WHITE/BEIGE

## (undated) DEVICE — DRAPE,U/ SHT,SPLIT,PLAS,STERIL: Brand: MEDLINE